# Patient Record
Sex: FEMALE | Race: WHITE | NOT HISPANIC OR LATINO | Employment: UNEMPLOYED | URBAN - METROPOLITAN AREA
[De-identification: names, ages, dates, MRNs, and addresses within clinical notes are randomized per-mention and may not be internally consistent; named-entity substitution may affect disease eponyms.]

---

## 2018-09-05 ENCOUNTER — OFFICE VISIT (OUTPATIENT)
Dept: URGENT CARE | Facility: PHYSICIAN GROUP | Age: 28
End: 2018-09-05
Payer: OTHER GOVERNMENT

## 2018-09-05 VITALS
HEART RATE: 122 BPM | OXYGEN SATURATION: 98 % | DIASTOLIC BLOOD PRESSURE: 64 MMHG | TEMPERATURE: 99.3 F | SYSTOLIC BLOOD PRESSURE: 100 MMHG | RESPIRATION RATE: 16 BRPM

## 2018-09-05 DIAGNOSIS — R00.0 TACHYCARDIA: ICD-10-CM

## 2018-09-05 DIAGNOSIS — J02.9 PHARYNGITIS, UNSPECIFIED ETIOLOGY: ICD-10-CM

## 2018-09-05 DIAGNOSIS — R11.2 NON-INTRACTABLE VOMITING WITH NAUSEA, UNSPECIFIED VOMITING TYPE: ICD-10-CM

## 2018-09-05 LAB
INT CON NEG: NEGATIVE
INT CON POS: POSITIVE
S PYO AG THROAT QL: NORMAL

## 2018-09-05 PROCEDURE — 99204 OFFICE O/P NEW MOD 45 MIN: CPT | Performed by: EMERGENCY MEDICINE

## 2018-09-05 PROCEDURE — 93000 ELECTROCARDIOGRAM COMPLETE: CPT | Performed by: EMERGENCY MEDICINE

## 2018-09-05 PROCEDURE — 87880 STREP A ASSAY W/OPTIC: CPT | Performed by: EMERGENCY MEDICINE

## 2018-09-05 RX ORDER — SULFACETAMIDE SODIUM 100 MG/ML
1 SOLUTION/ DROPS OPHTHALMIC
Qty: 1 BOTTLE | Refills: 0 | Status: SHIPPED | OUTPATIENT
Start: 2018-09-05 | End: 2018-12-06

## 2018-09-05 RX ORDER — ONDANSETRON 4 MG/1
4 TABLET, ORALLY DISINTEGRATING ORAL EVERY 6 HOURS PRN
Qty: 10 TAB | Refills: 0 | Status: SHIPPED | OUTPATIENT
Start: 2018-09-05 | End: 2019-07-09

## 2018-09-05 RX ORDER — ONDANSETRON 4 MG/1
4 TABLET, ORALLY DISINTEGRATING ORAL ONCE
Status: DISCONTINUED | OUTPATIENT
Start: 2018-09-05 | End: 2018-09-05

## 2018-09-05 RX ORDER — ONDANSETRON 4 MG/1
4 TABLET, ORALLY DISINTEGRATING ORAL ONCE
Status: COMPLETED | OUTPATIENT
Start: 2018-09-05 | End: 2018-09-05

## 2018-09-05 RX ADMIN — ONDANSETRON 4 MG: 4 TABLET, ORALLY DISINTEGRATING ORAL at 18:29

## 2018-09-05 ASSESSMENT — ENCOUNTER SYMPTOMS
SENSORY CHANGE: 0
EYE REDNESS: 1
CHILLS: 0
COUGH: 1
SORE THROAT: 1
SPEECH CHANGE: 0
NAUSEA: 0
DIARRHEA: 1
ABDOMINAL PAIN: 0
VOMITING: 1
FEVER: 1
NECK PAIN: 0
NERVOUS/ANXIOUS: 0
MYALGIAS: 0
SINUS PAIN: 1
PALPITATIONS: 1
EYE DISCHARGE: 1
STRIDOR: 0

## 2018-09-05 NOTE — LETTER
September 5, 2018        Mago Beauchamp  [address] [place] [address]        Dear Mago:    Please ask for the next two days off from work for medical reasons     If you have any questions or concerns, please don't hesitate to call.        Sincerely,        Isaias Roque M.D.    Electronically Signed

## 2018-09-06 NOTE — PROGRESS NOTES
Subjective:      Mago Beauchamp is a 28 y.o. female who presents with Emesis (eye redness, vomiting, cough, congestion, diarrhea x1 wk)            HPI    Patient is a 28-year-old female complaining nausea, vomiting, diarrhea . She still nauseated her diarrhea has improved. No foreign travel or risk factors for her diarrhea.    Patient also complains of URI symptoms of conjunctivitis which is her main complaint her eyes were cleaned shot today. Her cough is persistent and dry but she states she does not need any treatment for it. She is also congestion primarily with sinus discomfort.     She states the people she has similar complaints. She denies being pregnant    PMH:  has no past medical history on file.  MEDS: No current outpatient prescriptions on file.    Current Facility-Administered Medications:   •  ondansetron (ZOFRAN ODT) dispertab 4 mg, 4 mg, Oral, Once, Isaias Roque M.D.  ALLERGIES: No Known Allergies  SURGHX: History reviewed. No pertinent surgical history.  SOCHX:  reports that she has never smoked. She has never used smokeless tobacco.  FH: family history is not on file.  Review of Systems   Constitutional: Positive for fever. Negative for chills.   HENT: Positive for sinus pain and sore throat. Negative for congestion.    Eyes: Positive for discharge and redness.   Respiratory: Positive for cough. Negative for stridor.    Cardiovascular: Positive for palpitations. Negative for chest pain.   Gastrointestinal: Positive for diarrhea and vomiting. Negative for abdominal pain and nausea.   Genitourinary: Negative.         Chronic pelvic pain   Musculoskeletal: Negative for myalgias and neck pain.   Skin: Negative for itching and rash.   Neurological: Negative for sensory change and speech change.   Psychiatric/Behavioral: The patient is not nervous/anxious.           Objective:     /64   Pulse (!) 122   Temp 37.4 °C (99.3 °F)   Resp 16   SpO2 98%      Physical Exam   Constitutional: She  appears well-developed and well-nourished. She appears distressed.   HENT:   Head: Normocephalic and atraumatic.   Right Ear: External ear normal.   Left Ear: External ear normal.   Eyes: Pupils are equal, round, and reactive to light. EOM are normal. Right eye exhibits discharge.   Patient has bilateral conjunctivitis right greater than left. Slight discharge on her lipids.   Neck: Normal range of motion.   Cardiovascular: Intact distal pulses.    Heart rate initially 122, EKG was performed with normal findings and the heart rate of 96   Pulmonary/Chest: Effort normal and breath sounds normal. No stridor. No respiratory distress. She has no rales.   Abdominal: She exhibits no distension. There is no tenderness.   Musculoskeletal: Normal range of motion. She exhibits no tenderness.   Lymphadenopathy:     She has no cervical adenopathy.   Neurological: Coordination normal.   Skin: Skin is dry. No rash noted. No erythema.   Psychiatric: She has a normal mood and affect. Her behavior is normal.   Vitals reviewed.            strep negative     EKG sinus rhythm 96 no ST elevations no dysrhythmias no ischemia.  Assessment/Plan:     1. Non-intractable vomiting with nausea, unspecified vomiting type    - ondansetron (ZOFRAN ODT) dispertab 4 mg; Take 1 Tab by mouth Once.    2. Pharyngitis, unspecified etiology      3. Gastroenteritis improving    - POCT Rapid Strep A  I feel the patient's main symptoms are based on a viral illness. Other family members have similar symptoms and sequelae.  I am recommending the patient initiate/ continue hydration efforts including the use of a vaporizer/humidifier/ netti pot. I also recommend the pt, initiate Mucinex, Sudafed or Dayquil if not hypertensive. In addition the patient will initiate the prescribed prescription medication/s:Sulamyd 4 times a day to both eyes.    . If the patient's condition exacerbates with worsening dysphagia, shortness of breath, uncontrolled fever, headache or  chest pressure he/she will return immediately to the urgent care or go to  the emergency department for further evaluation.     Patient works at [employer] and declined a work but I keep her one as I don't think she would be old to work in normal shift based on how she looks to me.    Isaias Roque

## 2018-11-30 ENCOUNTER — TELEPHONE (OUTPATIENT)
Dept: SCHEDULING | Facility: IMAGING CENTER | Age: 28
End: 2018-11-30

## 2018-12-06 ENCOUNTER — APPOINTMENT (OUTPATIENT)
Dept: RADIOLOGY | Facility: MEDICAL CENTER | Age: 28
End: 2018-12-06
Attending: HOSPITALIST
Payer: OTHER GOVERNMENT

## 2018-12-06 ENCOUNTER — HOSPITAL ENCOUNTER (OUTPATIENT)
Facility: MEDICAL CENTER | Age: 28
End: 2018-12-07
Attending: EMERGENCY MEDICINE | Admitting: HOSPITALIST
Payer: OTHER GOVERNMENT

## 2018-12-06 DIAGNOSIS — R51.9 ACUTE INTRACTABLE HEADACHE, UNSPECIFIED HEADACHE TYPE: ICD-10-CM

## 2018-12-06 DIAGNOSIS — G44.201 ACUTE INTRACTABLE TENSION-TYPE HEADACHE: ICD-10-CM

## 2018-12-06 DIAGNOSIS — R42 LIGHTHEADEDNESS: ICD-10-CM

## 2018-12-06 PROBLEM — F17.200 TOBACCO DEPENDENCE: Status: ACTIVE | Noted: 2018-12-06

## 2018-12-06 PROBLEM — Z3A.12 12 WEEKS GESTATION OF PREGNANCY: Status: ACTIVE | Noted: 2018-12-06

## 2018-12-06 PROBLEM — G44.209 TENSION TYPE HEADACHE: Status: ACTIVE | Noted: 2018-12-06

## 2018-12-06 PROBLEM — Z3A.01 LESS THAN 8 WEEKS GESTATION OF PREGNANCY: Status: ACTIVE | Noted: 2018-12-06

## 2018-12-06 LAB
ALBUMIN SERPL BCP-MCNC: 4.4 G/DL (ref 3.2–4.9)
ALBUMIN/GLOB SERPL: 1.6 G/DL
ALP SERPL-CCNC: 47 U/L (ref 30–99)
ALT SERPL-CCNC: 7 U/L (ref 2–50)
ANION GAP SERPL CALC-SCNC: 10 MMOL/L (ref 0–11.9)
APPEARANCE UR: ABNORMAL
AST SERPL-CCNC: 13 U/L (ref 12–45)
BACTERIA #/AREA URNS HPF: ABNORMAL /HPF
BASOPHILS # BLD AUTO: 0.4 % (ref 0–1.8)
BASOPHILS # BLD: 0.04 K/UL (ref 0–0.12)
BILIRUB SERPL-MCNC: 0.4 MG/DL (ref 0.1–1.5)
BILIRUB UR QL STRIP.AUTO: NEGATIVE
BUN SERPL-MCNC: 11 MG/DL (ref 8–22)
CALCIUM SERPL-MCNC: 9.5 MG/DL (ref 8.5–10.5)
CHLORIDE SERPL-SCNC: 104 MMOL/L (ref 96–112)
CO2 SERPL-SCNC: 23 MMOL/L (ref 20–33)
COLOR UR: YELLOW
CREAT SERPL-MCNC: 0.63 MG/DL (ref 0.5–1.4)
D DIMER PPP IA.FEU-MCNC: <0.4 UG/ML (FEU) (ref 0–0.5)
EKG IMPRESSION: NORMAL
EOSINOPHIL # BLD AUTO: 0.03 K/UL (ref 0–0.51)
EOSINOPHIL NFR BLD: 0.3 % (ref 0–6.9)
EPI CELLS #/AREA URNS HPF: ABNORMAL /HPF
ERYTHROCYTE [DISTWIDTH] IN BLOOD BY AUTOMATED COUNT: 42.1 FL (ref 35.9–50)
GLOBULIN SER CALC-MCNC: 2.7 G/DL (ref 1.9–3.5)
GLUCOSE SERPL-MCNC: 108 MG/DL (ref 65–99)
GLUCOSE UR STRIP.AUTO-MCNC: NEGATIVE MG/DL
HCT VFR BLD AUTO: 41.6 % (ref 37–47)
HGB BLD-MCNC: 14.1 G/DL (ref 12–16)
IMM GRANULOCYTES # BLD AUTO: 0.05 K/UL (ref 0–0.11)
IMM GRANULOCYTES NFR BLD AUTO: 0.5 % (ref 0–0.9)
KETONES UR STRIP.AUTO-MCNC: NEGATIVE MG/DL
LEUKOCYTE ESTERASE UR QL STRIP.AUTO: NEGATIVE
LYMPHOCYTES # BLD AUTO: 1.46 K/UL (ref 1–4.8)
LYMPHOCYTES NFR BLD: 13.6 % (ref 22–41)
MAGNESIUM SERPL-MCNC: 1.9 MG/DL (ref 1.5–2.5)
MCH RBC QN AUTO: 31 PG (ref 27–33)
MCHC RBC AUTO-ENTMCNC: 33.9 G/DL (ref 33.6–35)
MCV RBC AUTO: 91.4 FL (ref 81.4–97.8)
MICRO URNS: ABNORMAL
MONOCYTES # BLD AUTO: 0.4 K/UL (ref 0–0.85)
MONOCYTES NFR BLD AUTO: 3.7 % (ref 0–13.4)
NEUTROPHILS # BLD AUTO: 8.76 K/UL (ref 2–7.15)
NEUTROPHILS NFR BLD: 81.5 % (ref 44–72)
NITRITE UR QL STRIP.AUTO: NEGATIVE
NRBC # BLD AUTO: 0 K/UL
NRBC BLD-RTO: 0 /100 WBC
PH UR STRIP.AUTO: 7.5 [PH]
PHOSPHATE SERPL-MCNC: 3.9 MG/DL (ref 2.5–4.5)
PLATELET # BLD AUTO: 237 K/UL (ref 164–446)
PMV BLD AUTO: 10.2 FL (ref 9–12.9)
POTASSIUM SERPL-SCNC: 3.9 MMOL/L (ref 3.6–5.5)
PROT SERPL-MCNC: 7.1 G/DL (ref 6–8.2)
PROT UR QL STRIP: NEGATIVE MG/DL
RBC # BLD AUTO: 4.55 M/UL (ref 4.2–5.4)
RBC UR QL AUTO: NEGATIVE
SODIUM SERPL-SCNC: 137 MMOL/L (ref 135–145)
SP GR UR STRIP.AUTO: 1.02
TSH SERPL DL<=0.005 MIU/L-ACNC: 0.11 UIU/ML (ref 0.38–5.33)
UROBILINOGEN UR STRIP.AUTO-MCNC: 0.2 MG/DL
WBC # BLD AUTO: 10.7 K/UL (ref 4.8–10.8)
WBC #/AREA URNS HPF: ABNORMAL /HPF

## 2018-12-06 PROCEDURE — G0378 HOSPITAL OBSERVATION PER HR: HCPCS

## 2018-12-06 PROCEDURE — 700105 HCHG RX REV CODE 258: Performed by: HOSPITALIST

## 2018-12-06 PROCEDURE — 85025 COMPLETE CBC W/AUTO DIFF WBC: CPT

## 2018-12-06 PROCEDURE — 700102 HCHG RX REV CODE 250 W/ 637 OVERRIDE(OP): Performed by: HOSPITALIST

## 2018-12-06 PROCEDURE — 96374 THER/PROPH/DIAG INJ IV PUSH: CPT

## 2018-12-06 PROCEDURE — 99285 EMERGENCY DEPT VISIT HI MDM: CPT

## 2018-12-06 PROCEDURE — 700105 HCHG RX REV CODE 258: Performed by: EMERGENCY MEDICINE

## 2018-12-06 PROCEDURE — 71045 X-RAY EXAM CHEST 1 VIEW: CPT

## 2018-12-06 PROCEDURE — 99219 PR INITIAL OBSERVATION CARE,LEVL II: CPT | Performed by: HOSPITALIST

## 2018-12-06 PROCEDURE — A9270 NON-COVERED ITEM OR SERVICE: HCPCS | Performed by: HOSPITALIST

## 2018-12-06 PROCEDURE — 84443 ASSAY THYROID STIM HORMONE: CPT

## 2018-12-06 PROCEDURE — 84702 CHORIONIC GONADOTROPIN TEST: CPT

## 2018-12-06 PROCEDURE — 94760 N-INVAS EAR/PLS OXIMETRY 1: CPT

## 2018-12-06 PROCEDURE — 93005 ELECTROCARDIOGRAM TRACING: CPT | Performed by: EMERGENCY MEDICINE

## 2018-12-06 PROCEDURE — 83735 ASSAY OF MAGNESIUM: CPT

## 2018-12-06 PROCEDURE — 700111 HCHG RX REV CODE 636 W/ 250 OVERRIDE (IP): Performed by: EMERGENCY MEDICINE

## 2018-12-06 PROCEDURE — 84100 ASSAY OF PHOSPHORUS: CPT

## 2018-12-06 PROCEDURE — 85379 FIBRIN DEGRADATION QUANT: CPT

## 2018-12-06 PROCEDURE — 80053 COMPREHEN METABOLIC PANEL: CPT

## 2018-12-06 PROCEDURE — 70450 CT HEAD/BRAIN W/O DYE: CPT

## 2018-12-06 PROCEDURE — 81001 URINALYSIS AUTO W/SCOPE: CPT

## 2018-12-06 RX ORDER — POLYETHYLENE GLYCOL 3350 17 G/17G
1 POWDER, FOR SOLUTION ORAL
Status: DISCONTINUED | OUTPATIENT
Start: 2018-12-06 | End: 2018-12-07 | Stop reason: HOSPADM

## 2018-12-06 RX ORDER — ONDANSETRON 2 MG/ML
4 INJECTION INTRAMUSCULAR; INTRAVENOUS ONCE
Status: COMPLETED | OUTPATIENT
Start: 2018-12-06 | End: 2018-12-06

## 2018-12-06 RX ORDER — AMOXICILLIN 250 MG
2 CAPSULE ORAL 2 TIMES DAILY
Status: DISCONTINUED | OUTPATIENT
Start: 2018-12-06 | End: 2018-12-07 | Stop reason: HOSPADM

## 2018-12-06 RX ORDER — PROMETHAZINE HYDROCHLORIDE 12.5 MG/1
12.5-25 SUPPOSITORY RECTAL EVERY 4 HOURS PRN
Status: DISCONTINUED | OUTPATIENT
Start: 2018-12-06 | End: 2018-12-07 | Stop reason: HOSPADM

## 2018-12-06 RX ORDER — POLYETHYLENE GLYCOL 3350 17 G/17G
1 POWDER, FOR SOLUTION ORAL
Status: DISCONTINUED | OUTPATIENT
Start: 2018-12-06 | End: 2018-12-06

## 2018-12-06 RX ORDER — ONDANSETRON 4 MG/1
4 TABLET, ORALLY DISINTEGRATING ORAL EVERY 4 HOURS PRN
Status: DISCONTINUED | OUTPATIENT
Start: 2018-12-06 | End: 2018-12-07 | Stop reason: HOSPADM

## 2018-12-06 RX ORDER — SODIUM CHLORIDE 9 MG/ML
INJECTION, SOLUTION INTRAVENOUS CONTINUOUS
Status: DISCONTINUED | OUTPATIENT
Start: 2018-12-06 | End: 2018-12-07 | Stop reason: HOSPADM

## 2018-12-06 RX ORDER — SODIUM CHLORIDE 9 MG/ML
1000 INJECTION, SOLUTION INTRAVENOUS ONCE
Status: COMPLETED | OUTPATIENT
Start: 2018-12-06 | End: 2018-12-06

## 2018-12-06 RX ORDER — ALBUTEROL SULFATE 90 UG/1
2 AEROSOL, METERED RESPIRATORY (INHALATION) EVERY 4 HOURS PRN
Status: DISCONTINUED | OUTPATIENT
Start: 2018-12-06 | End: 2018-12-07 | Stop reason: HOSPADM

## 2018-12-06 RX ORDER — ACETAMINOPHEN 325 MG/1
650 TABLET ORAL EVERY 6 HOURS PRN
Status: DISCONTINUED | OUTPATIENT
Start: 2018-12-06 | End: 2018-12-07 | Stop reason: HOSPADM

## 2018-12-06 RX ORDER — BUTALBITAL, ACETAMINOPHEN AND CAFFEINE 50; 325; 40 MG/1; MG/1; MG/1
1-2 TABLET ORAL EVERY 6 HOURS PRN
Status: DISCONTINUED | OUTPATIENT
Start: 2018-12-06 | End: 2018-12-07 | Stop reason: HOSPADM

## 2018-12-06 RX ORDER — ONDANSETRON 2 MG/ML
4 INJECTION INTRAMUSCULAR; INTRAVENOUS EVERY 4 HOURS PRN
Status: DISCONTINUED | OUTPATIENT
Start: 2018-12-06 | End: 2018-12-07 | Stop reason: HOSPADM

## 2018-12-06 RX ORDER — BISACODYL 10 MG
10 SUPPOSITORY, RECTAL RECTAL
Status: DISCONTINUED | OUTPATIENT
Start: 2018-12-06 | End: 2018-12-07 | Stop reason: HOSPADM

## 2018-12-06 RX ORDER — BISACODYL 10 MG
10 SUPPOSITORY, RECTAL RECTAL
Status: DISCONTINUED | OUTPATIENT
Start: 2018-12-06 | End: 2018-12-06

## 2018-12-06 RX ORDER — TRAMADOL HYDROCHLORIDE 50 MG/1
50 TABLET ORAL EVERY 6 HOURS PRN
Status: DISCONTINUED | OUTPATIENT
Start: 2018-12-06 | End: 2018-12-07 | Stop reason: HOSPADM

## 2018-12-06 RX ORDER — AMOXICILLIN 250 MG
2 CAPSULE ORAL 2 TIMES DAILY
Status: DISCONTINUED | OUTPATIENT
Start: 2018-12-06 | End: 2018-12-06

## 2018-12-06 RX ORDER — PROMETHAZINE HYDROCHLORIDE 25 MG/1
12.5-25 TABLET ORAL EVERY 4 HOURS PRN
Status: DISCONTINUED | OUTPATIENT
Start: 2018-12-06 | End: 2018-12-07 | Stop reason: HOSPADM

## 2018-12-06 RX ADMIN — STANDARDIZED SENNA CONCENTRATE AND DOCUSATE SODIUM 2 TABLET: 8.6; 5 TABLET, FILM COATED ORAL at 18:39

## 2018-12-06 RX ADMIN — TRAMADOL HYDROCHLORIDE 50 MG: 50 TABLET, FILM COATED ORAL at 20:07

## 2018-12-06 RX ADMIN — SODIUM CHLORIDE: 9 INJECTION, SOLUTION INTRAVENOUS at 18:40

## 2018-12-06 RX ADMIN — ONDANSETRON 4 MG: 2 INJECTION INTRAMUSCULAR; INTRAVENOUS at 16:10

## 2018-12-06 RX ADMIN — SODIUM CHLORIDE 1000 ML: 9 INJECTION, SOLUTION INTRAVENOUS at 15:15

## 2018-12-06 ASSESSMENT — COPD QUESTIONNAIRES
COPD SCREENING SCORE: 4
DURING THE PAST 4 WEEKS HOW MUCH DID YOU FEEL SHORT OF BREATH: SOME OF THE TIME
HAVE YOU SMOKED AT LEAST 100 CIGARETTES IN YOUR ENTIRE LIFE: YES
DO YOU EVER COUGH UP ANY MUCUS OR PHLEGM?: YES, A FEW DAYS A WEEK OR MONTH

## 2018-12-06 ASSESSMENT — LIFESTYLE VARIABLES
EVER_SMOKED: YES
ALCOHOL_USE: NO
EVER_SMOKED: YES

## 2018-12-06 ASSESSMENT — ENCOUNTER SYMPTOMS
RESPIRATORY NEGATIVE: 1
MUSCULOSKELETAL NEGATIVE: 1
CONSTITUTIONAL NEGATIVE: 1
NAUSEA: 1
HEADACHES: 1
DIZZINESS: 1
CARDIOVASCULAR NEGATIVE: 1
VOMITING: 0

## 2018-12-06 ASSESSMENT — PATIENT HEALTH QUESTIONNAIRE - PHQ9
2. FEELING DOWN, DEPRESSED, IRRITABLE, OR HOPELESS: NOT AT ALL
1. LITTLE INTEREST OR PLEASURE IN DOING THINGS: NOT AT ALL
SUM OF ALL RESPONSES TO PHQ9 QUESTIONS 1 AND 2: 0

## 2018-12-06 ASSESSMENT — PAIN SCALES - GENERAL
PAINLEVEL_OUTOF10: 7
PAINLEVEL_OUTOF10: 5

## 2018-12-06 NOTE — ED PROVIDER NOTES
"ED Provider Note    Scribed for Laura Carty M.D. by Young Barreto. 2018, 2:21 PM.    Primary care provider: Pcp Pt States None  Means of arrival: Walk In  History obtained from: Patient  History limited by: None    CHIEF COMPLAINT  Chief Complaint   Patient presents with   • Headache   • Dizziness     HPI  Mago Beauchamp is a 28 y.o. pregnant female who presents to the Emergency Department for a headache.    The patient complains of an onset of headache at 8:45 AM this morning while bending over sitting on the couch. Her headache has been constant, located \"behind my eyes, bilateral temples, and the back of my head\". She rates her current headache as 8/10 in severity, and describes light sensitivity.    The patient complains of lightheadedness, dizziness, and constant \"pounding\" heart palpitations associated with her pregnancy. She states her dizziness is slightly worse with rapid head movements. She denies any vomiting in the last 1 week. She complains of some nausea today. The patient complains of blurred vision secondary to her dizziness, worse in right eye than left eye. The patient reports a history of similar symptoms associated with her previous pregnancies, but states her current headache is worse in severity. She has treated her headache with PO Tylenol without relief.    She complains of generalized muscle soreness, which is greatest over her bilateral shoulders and breasts. The patient describes \"pain in my ovaries\", left greater than right, associated with her current pregnancy, which is new from her previous pregnancies. The patient has an obstetric history of , reporting she is approximately 12 weeks pregnant. The patient's last ultrasound was 5 days ago while at [facility], which patient states was normal. The patient presented to [facility] last week for dizziness, where she was treated for vertigo. The patient is not yet established with OBGYN, stating she has recently " "moved back to [place].     The patient denies any fever, chest pain, shortness of breath, leg swelling, diarrhea.       REVIEW OF SYSTEMS  Pertinent positives include headache, dizziness, lightheadedness, heart palpitations,. Pertinent negatives include no fever, chest pain, shortness of breath, leg swelling, diarrhea. See HPI for further details. All other systems reviewed and negative.     PAST MEDICAL HISTORY   Denies history of DVT.     SURGICAL HISTORY  patient denies any surgical history    SOCIAL HISTORY  Social History   Substance Use Topics   • Smoking status: Current Every Day Smoker   • Smokeless tobacco: Never Used      History   Drug use: Unknown     FAMILY HISTORY  None noted.    CURRENT MEDICATIONS  Home Medications     Reviewed by Margaux Grimaldo R.N. (Registered Nurse) on 12/06/18 at 1203  Med List Status: Complete   Medication Last Dose Status   ondansetron (ZOFRAN ODT) 4 MG TABLET DISPERSIBLE 12/5/2018 Active              ALLERGIES  Allergies   Allergen Reactions   • Reglan [Metoclopramide]    • Zoloft [Sertraline]      PHYSICAL EXAM  VITAL SIGNS: /69   Pulse (!) 53   Temp 36.8 °C (98.2 °F) (Temporal)   Resp 12   Ht 1.676 m (5' 6\")   Wt 52 kg (114 lb 10.2 oz)   LMP 09/19/2018   SpO2 97%   BMI 18.50 kg/m²     General:  Thin, slightly pale appearing. DWN, nontoxic appearing in NAD; A+Ox3; V/S as above. Afebrile.   Skin: warm and dry; good color; no rash.   HEENT: NCAT; EOMs intact; PERRL; no scleral icterus.  No scalp tenderness.  Neck: FROM; no meningismus, no LAD.   Cardiovascular: Bradycardic heart rate. No murmurs, rubs, or gallops; pulses 2+ bilaterally radially and DP areas.   Lungs: Clear to auscultation with good air movement bilaterally. No wheezes, rhonchi, or rales.   Abdomen: BS present; soft; NTND; no rebound, guarding, or rigidity. No organomegaly or pulsatile mass; no CVAT   Extremities: PATEL x 4; no e/o trauma; no pedal edema   Neurologic: CNs III-XII formally intact; " speech clear; distal sensation intact; strength 5/5 UE/LEs.   Psychiatric: Appropriate affect, normal mood                                                        DIAGNOSTIC STUDIES / PROCEDURES    LABS  Results for orders placed or performed during the hospital encounter of 12/06/18   CBC WITH DIFFERENTIAL   Result Value Ref Range    WBC 10.7 4.8 - 10.8 K/uL    RBC 4.55 4.20 - 5.40 M/uL    Hemoglobin 14.1 12.0 - 16.0 g/dL    Hematocrit 41.6 37.0 - 47.0 %    MCV 91.4 81.4 - 97.8 fL    MCH 31.0 27.0 - 33.0 pg    MCHC 33.9 33.6 - 35.0 g/dL    RDW 42.1 35.9 - 50.0 fL    Platelet Count 237 164 - 446 K/uL    MPV 10.2 9.0 - 12.9 fL    Neutrophils-Polys 81.50 (H) 44.00 - 72.00 %    Lymphocytes 13.60 (L) 22.00 - 41.00 %    Monocytes 3.70 0.00 - 13.40 %    Eosinophils 0.30 0.00 - 6.90 %    Basophils 0.40 0.00 - 1.80 %    Immature Granulocytes 0.50 0.00 - 0.90 %    Nucleated RBC 0.00 /100 WBC    Neutrophils (Absolute) 8.76 (H) 2.00 - 7.15 K/uL    Lymphs (Absolute) 1.46 1.00 - 4.80 K/uL    Monos (Absolute) 0.40 0.00 - 0.85 K/uL    Eos (Absolute) 0.03 0.00 - 0.51 K/uL    Baso (Absolute) 0.04 0.00 - 0.12 K/uL    Immature Granulocytes (abs) 0.05 0.00 - 0.11 K/uL    NRBC (Absolute) 0.00 K/uL   CMP   Result Value Ref Range    Sodium 137 135 - 145 mmol/L    Potassium 3.9 3.6 - 5.5 mmol/L    Chloride 104 96 - 112 mmol/L    Co2 23 20 - 33 mmol/L    Anion Gap 10.0 0.0 - 11.9    Glucose 108 (H) 65 - 99 mg/dL    Bun 11 8 - 22 mg/dL    Creatinine 0.63 0.50 - 1.40 mg/dL    Calcium 9.5 8.5 - 10.5 mg/dL    AST(SGOT) 13 12 - 45 U/L    ALT(SGPT) 7 2 - 50 U/L    Alkaline Phosphatase 47 30 - 99 U/L    Total Bilirubin 0.4 0.1 - 1.5 mg/dL    Albumin 4.4 3.2 - 4.9 g/dL    Total Protein 7.1 6.0 - 8.2 g/dL    Globulin 2.7 1.9 - 3.5 g/dL    A-G Ratio 1.6 g/dL   URINALYSIS   Result Value Ref Range    Color Yellow     Character Turbid (A)     Specific Gravity 1.024 <1.035    Ph 7.5 5.0 - 8.0    Glucose Negative Negative mg/dL    Ketones Negative Negative  mg/dL    Protein Negative Negative mg/dL    Bilirubin Negative Negative    Urobilinogen, Urine 0.2 Negative    Nitrite Negative Negative    Leukocyte Esterase Negative Negative    Occult Blood Negative Negative    Micro Urine Req Microscopic    MAGNESIUM   Result Value Ref Range    Magnesium 1.9 1.5 - 2.5 mg/dL   PHOSPHORUS   Result Value Ref Range    Phosphorus 3.9 2.5 - 4.5 mg/dL   TSH   Result Value Ref Range    TSH 0.110 (L) 0.380 - 5.330 uIU/mL   URINE MICROSCOPIC (W/UA)   Result Value Ref Range    WBC 0-2 /hpf    Bacteria Few (A) None /hpf    Epithelial Cells Few /hpf   ESTIMATED GFR   Result Value Ref Range    GFR If African American >60 >60 mL/min/1.73 m 2    GFR If Non African American >60 >60 mL/min/1.73 m 2   D-DIMER   Result Value Ref Range    D-Dimer Screen <0.40 0.00 - 0.50 ug/mL (FEU)   EKG   Result Value Ref Range    Report       [facility] Emergency Dept.    Test Date:  2018  Pt Name:    LAURIE BEAUCHAMP                Department: ER  MRN:        [id number]                      Room:       [facility]  Gender:     Female                       Technician: [id number]  :        1990                   Requested By:KATHLEEN MASCORRO  Order #:    [id number]                    Reading MD: KATHLEEN MASCORRO MD    Measurements  Intervals                                Axis  Rate:       77                           P:          40  NC:         132                          QRS:        74  QRSD:       76                           T:          45  QT:         376  QTc:        426    Interpretive Statements  SINUS RHYTHM  No previous ECG available for comparison    Electronically Signed On 2018 17:05:29 PST by KATHLEEN MASCORRO MD        All labs reviewed by me.      COURSE & MEDICAL DECISION MAKING  Pertinent Labs & Imaging studies reviewed. (See chart for details)    Laurie Beauchamp is a 28 y.o. female who presents complaining of headache, dizziness, pelvic pain.  Patient is ,  approximately 12 weeks pregnant.     Differential Diagnoses include but are not limited to: Space-occupying lesion, venous sinus thrombosis, dehydration, electrolyte abnormality, UTI, orthostatic hypotension.  I do not suspect meningismus or intracranial hemorrhage.      2:21 PM - Patient seen and examined at bedside. Apologized to patient for long wait. Ordered urinalysis, magnesium, phosphorous, TSH, CBC, CMP, EKG to evaluate her symptoms. Patient's blood pressure is initially 118/69.  NS bolus ordered for headache, dizziness and report of decreased intake.    4:05 PM Recheck: Patient re-evaluated at beside. Patient denies any improvement in her headache after resuscitation with IV fluids. She complains she feels hungry, and is now complaining of new right lower extremity pain.     4:23 PM Discussed patient's condition with Dr. Gonzales, Hospitalist, who is agreeable to admit the patient.       Disposition:  Patient will be admitted to the hospital under the care of Dr. Gonzales (Hospitalist) in guarded condition.      FINAL IMPRESSION  1. Acute intractable headache, unspecified headache type    2. Lightheadedness          Young WILLIS (Scribe), am scribing for, and in the presence of, Laura Carty M.D..    Electronically signed by: Young Barreto (Bernabeibe), 12/6/2018    Laura WILLIS M.D. personally performed the services described in this documentation, as scribed by Young Barreto in my presence, and it is both accurate and complete.    The note accurately reflects work and decisions made by me.  Laura Carty  12/6/2018  9:28 PM

## 2018-12-06 NOTE — ED NOTES
Pt comfort check. Pt upset with wait time to see physician. Apologized for wait and comfort measures provided. Gave another warm blanket and reassurance. nad

## 2018-12-06 NOTE — ED TRIAGE NOTES
Pt comes in complaining of HAs and blurred vision. Pt reporting she is approx 12 weeks. Pt complaining of nausea but no vomiting.

## 2018-12-07 ENCOUNTER — PATIENT OUTREACH (OUTPATIENT)
Dept: HEALTH INFORMATION MANAGEMENT | Facility: OTHER | Age: 28
End: 2018-12-07

## 2018-12-07 VITALS
HEART RATE: 69 BPM | RESPIRATION RATE: 20 BRPM | OXYGEN SATURATION: 98 % | SYSTOLIC BLOOD PRESSURE: 110 MMHG | WEIGHT: 113.1 LBS | TEMPERATURE: 98.5 F | BODY MASS INDEX: 18.18 KG/M2 | DIASTOLIC BLOOD PRESSURE: 64 MMHG | HEIGHT: 66 IN

## 2018-12-07 PROBLEM — G44.209 TENSION TYPE HEADACHE: Status: RESOLVED | Noted: 2018-12-06 | Resolved: 2018-12-07

## 2018-12-07 LAB — B-HCG SERPL-ACNC: ABNORMAL MIU/ML (ref 0–5)

## 2018-12-07 PROCEDURE — 99217 PR OBSERVATION CARE DISCHARGE: CPT | Performed by: HOSPITALIST

## 2018-12-07 PROCEDURE — 700111 HCHG RX REV CODE 636 W/ 250 OVERRIDE (IP): Performed by: HOSPITALIST

## 2018-12-07 PROCEDURE — G0378 HOSPITAL OBSERVATION PER HR: HCPCS

## 2018-12-07 PROCEDURE — A9270 NON-COVERED ITEM OR SERVICE: HCPCS | Performed by: HOSPITALIST

## 2018-12-07 PROCEDURE — 96375 TX/PRO/DX INJ NEW DRUG ADDON: CPT

## 2018-12-07 PROCEDURE — 700105 HCHG RX REV CODE 258: Performed by: HOSPITALIST

## 2018-12-07 PROCEDURE — 700102 HCHG RX REV CODE 250 W/ 637 OVERRIDE(OP): Performed by: HOSPITALIST

## 2018-12-07 RX ORDER — TRAMADOL HYDROCHLORIDE 50 MG/1
50 TABLET ORAL EVERY 6 HOURS PRN
Qty: 16 TAB | Refills: 0 | Status: SHIPPED | OUTPATIENT
Start: 2018-12-07 | End: 2018-12-11

## 2018-12-07 RX ADMIN — SODIUM CHLORIDE: 9 INJECTION, SOLUTION INTRAVENOUS at 07:49

## 2018-12-07 RX ADMIN — BUTALBITAL, ACETAMINOPHEN, AND CAFFEINE 2 TABLET: 50; 325; 40 TABLET ORAL at 06:28

## 2018-12-07 RX ADMIN — PROCHLORPERAZINE EDISYLATE 5 MG: 5 INJECTION INTRAMUSCULAR; INTRAVENOUS at 06:26

## 2018-12-07 RX ADMIN — TRAMADOL HYDROCHLORIDE 50 MG: 50 TABLET, FILM COATED ORAL at 02:22

## 2018-12-07 ASSESSMENT — PAIN SCALES - GENERAL
PAINLEVEL_OUTOF10: 7
PAINLEVEL_OUTOF10: 8
PAINLEVEL_OUTOF10: 2

## 2018-12-07 NOTE — PROGRESS NOTES
Discharge instructions, medications and follow-up reviewed with pt, pt verbalized understanding and denies questions. Discharge paperwork and prescription (x1) given to pt. PIV removed, armband removed. Pt walk off floor via wheelchair with hospital escort.

## 2018-12-07 NOTE — PROGRESS NOTES
Assumed care of patient at 1900. Bedside report received from SHAHBAZ Hodge. Initial assessment completed. Patient is A&Ox4 and able to make needs known; neuro intact. C/o HA 7/10. Medicated per MAR. VSS on RA. POC discussed with pt: IVF, pain control. Spouse at bedside. No further questions at this time. Fall precautions in place. Bed locked and in the lowest position, call light instructions provided, call light within reach.

## 2018-12-07 NOTE — H&P
Hospital Medicine History & Physical Note    Date of Service  12/6/2018    Primary Care Physician  Pcp Pt States None    Consultants  None    Code Status  Full code    Chief Complaint  Head    History of Presenting Illness  28 y.o. female who presented 12/6/2018 with results to get past med history other than being currently pregnant.  She is 3 weeks pregnant at this time and was doing fine until today when she bent down.  She had a sudden onset of a headache behind her eyes.  The left eye intensity 7-8 out of 10 sharp pain radiating to the back of her head it cannot.  His pain is lasted for several hours.  There is associated dizziness.  Nausea but no vomiting.  No fever or chills.      She currently does not have a OB/GYN doctor.    Does drink 1 cup of coffee per day but has not any coffee for the last tube    She is a smoker but has not smoked for the last 2-month  The patient denies any fever, chest pain, shortness of breath, leg swelling, diarrhea.     Review of Systems  Review of Systems   Constitutional: Negative.    HENT: Negative.    Respiratory: Negative.    Cardiovascular: Negative.    Gastrointestinal: Positive for nausea. Negative for vomiting.   Genitourinary: Negative.    Musculoskeletal: Negative.    Skin: Negative.    Neurological: Positive for dizziness and headaches.       Past Medical History  Patient denies    Tobacco dependent    Surgical History  Patient denies    Family History  Family history of stroke    Social History   reports that she has been smoking.  She has never used smokeless tobacco.     She smoked 1 pack every other day she started 817.  No smoking for last 2-month    No illicit drug    Allergies  Allergies   Allergen Reactions   • Reglan [Metoclopramide]    • Zoloft [Sertraline]        Medications  Prior to Admission Medications   Prescriptions Last Dose Informant Patient Reported? Taking?   ondansetron (ZOFRAN ODT) 4 MG TABLET DISPERSIBLE 12/5/2018 at Unknown time  No No   Sig:  Take 1 Tab by mouth every 6 hours as needed for Nausea.      Facility-Administered Medications: None       Physical Exam  Temp:  [36.8 °C (98.2 °F)] 36.8 °C (98.2 °F)  Pulse:  [53-88] 77  Resp:  [12-19] 19  BP: (106-123)/(60-71) 123/63    Physical Exam   Constitutional: She is oriented to person, place, and time. She appears well-developed and well-nourished. No distress.   HENT:   Head: Normocephalic and atraumatic.   Mouth/Throat: Oropharynx is clear and moist. No oropharyngeal exudate.   Eyes: Pupils are equal, round, and reactive to light. EOM are normal. Left eye exhibits no discharge. No scleral icterus.   Neck: Normal range of motion. Neck supple. No JVD present. No tracheal deviation present. No thyromegaly present.   Cardiovascular: Normal rate and intact distal pulses.  Exam reveals no gallop and no friction rub.    No murmur heard.  Pulmonary/Chest: Effort normal and breath sounds normal. No respiratory distress. She has no wheezes. She has no rales. She exhibits no tenderness.   Abdominal: Soft. Bowel sounds are normal. She exhibits no distension. There is no tenderness. There is no rebound and no guarding.   Musculoskeletal: Normal range of motion. She exhibits no edema or deformity.   Neurological: She is alert and oriented to person, place, and time. No cranial nerve deficit.   Skin: Skin is dry. No rash noted. She is not diaphoretic. No erythema. No pallor.   Psychiatric: She has a normal mood and affect.       Laboratory:  Recent Labs      12/06/18   1455   WBC  10.7   RBC  4.55   HEMOGLOBIN  14.1   HEMATOCRIT  41.6   MCV  91.4   MCH  31.0   MCHC  33.9   RDW  42.1   PLATELETCT  237   MPV  10.2     Recent Labs      12/06/18   1455   SODIUM  137   POTASSIUM  3.9   CHLORIDE  104   CO2  23   GLUCOSE  108*   BUN  11   CREATININE  0.63   CALCIUM  9.5     Recent Labs      12/06/18   1455   ALTSGPT  7   ASTSGOT  13   ALKPHOSPHAT  47   TBILIRUBIN  0.4   GLUCOSE  108*                 No results for input(s):  TROPONINI in the last 72 hours.    Urinalysis:    Recent Labs      12/06/18   1455   SPECGRAVITY  1.024   GLUCOSEUR  Negative   KETONES  Negative   NITRITE  Negative   LEUKESTERAS  Negative   WBCURINE  0-2   BACTERIA  Few*   EPITHELCELL  Few        Imaging:  CT-HEAD W/O    (Results Pending)         Assessment/Plan:  I anticipate this patient is appropriate for observation status at this time.    * Tension type headache- (present on admission)   Assessment & Plan    CT of the head     IV fluid hydration    Fioricet for headache    Ultram for headache    Check d-dimer    D-dimer is elevated will get an MRV of the head to look for venous thrombosis     Tobacco dependence- (present on admission)   Assessment & Plan    Smoking cessation advised     12 weeks gestation of pregnancy- (present on admission)   Assessment & Plan    She already had a recent ultrasound showing within normal limit         VTE prophylaxis: scd

## 2018-12-07 NOTE — DISCHARGE INSTRUCTIONS
Discharge Instructions    Discharged to home by car with relative. Discharged via wheelchair, hospital escort: Yes.  Special equipment needed: Not Applicable    Be sure to schedule a follow-up appointment with your primary care doctor or any specialists as instructed.     Discharge Plan:   Diet Plan: Discussed  Activity Level: Discussed  Smoking Cessation Offered: Patient Counseled  Confirmed Follow up Appointment: Patient to Call and Schedule Appointment  Confirmed Symptoms Management: Discussed  Medication Reconciliation Updated: Yes  Influenza Vaccine Indication: Not indicated: Previously immunized this influenza season and > 8 years of age    I understand that a diet low in cholesterol, fat, and sodium is recommended for good health. Unless I have been given specific instructions below for another diet, I accept this instruction as my diet prescription.   Other diet: Regular    Special Instructions: None    · Is patient discharged on Warfarin / Coumadin?   No     Depression / Suicide Risk    As you are discharged from this [facility] facility, it is important to learn how to keep safe from harming yourself.    Recognize the warning signs:  · Abrupt changes in personality, positive or negative- including increase in energy   · Giving away possessions  · Change in eating patterns- significant weight changes-  positive or negative  · Change in sleeping patterns- unable to sleep or sleeping all the time   · Unwillingness or inability to communicate  · Depression  · Unusual sadness, discouragement and loneliness  · Talk of wanting to die  · Neglect of personal appearance   · Rebelliousness- reckless behavior  · Withdrawal from people/activities they love  · Confusion- inability to concentrate     If you or a loved one observes any of these behaviors or has concerns about self-harm, here's what you can do:  · Talk about it- your feelings and reasons for harming yourself  · Remove any means that you might use to  hurt yourself (examples: pills, rope, extension cords, firearm)  · Get professional help from the community (Mental Health, Substance Abuse, psychological counseling)  · Do not be alone:Call your Safe Contact- someone whom you trust who will be there for you.  · Call your local CRISIS HOTLINE 688-6257 or 859-928-2695  · Call your local Children's Mobile Crisis Response Team [place] (305) 678-9738 or www.Mingly  · Call the toll free National Suicide Prevention Hotlines   · National Suicide Prevention Lifeline 745-431-KNCW (0488)  · National Hope Line Network 800-SUICIDE (873-4201)

## 2018-12-07 NOTE — PROGRESS NOTES
Pt c/o severe HA 7-8/10, L sided only at this time, behind the eye, L temple, radiating to back of head.

## 2018-12-07 NOTE — PROGRESS NOTES
"Pt c/o nausea and states, \"I don't feel good.\"  Pt vomited 50mL clear bile and water. C/o HA on both R and L side 7-8/10. Called pharmacy for recommendation re: safe antiemetic for pregnancy.  "

## 2018-12-07 NOTE — PROGRESS NOTES
Assessment completed. Pt A&Ox4. Respirations are even and unlabored on RA. Pt denies pain at this time. Reports nausea. Dizziness at this time, worse with movement. Monitors applied, VS stable, call light and belongings within reach. POC updated. Pt educated on room and call light, pt verbalized understanding. Communication board updated. Needs met. Friend/family at bedside.

## 2018-12-07 NOTE — ASSESSMENT & PLAN NOTE
CT of the head     IV fluid hydration    Fioricet for headache    Ultram for headache    Check d-dimer    D-dimer is elevated will get an MRV of the head to look for venous thrombosis

## 2018-12-07 NOTE — DISCHARGE SUMMARY
Discharge Summary    CHIEF COMPLAINT ON ADMISSION  Chief Complaint   Patient presents with   • Headache   • Dizziness       Reason for Admission  Headache; Blurred Vision      Admission Date  12/6/2018    CODE STATUS  Full Code    HPI & HOSPITAL COURSE  28 y.o. female who presented 12/6/2018 with results to get past med history other than being currently pregnant.  She is 3 weeks pregnant at this time and was doing fine until today when she bent down.  She had a sudden onset of a headache behind her eyes.  The left eye intensity 7-8 out of 10 sharp pain radiating to the back of her head it cannot.  His pain is lasted for several hours.  There is associated dizziness.  Nausea but no vomiting.  No fever or chills.        She currently does not have a OB/GYN doctor.     Does drink 1 cup of coffee per day but has not any coffee for the last tube     She is a smoker but has not smoked for the last 2-month  The patient denies any fever, chest pain, shortness of breath, leg swelling, diarrhea.      This patient complained of shortness of breath.  X-ray of the chest was normal.  Oxygen saturations were normal.  CT head was done was negative.  Her pain in the head improved with p.o. Ultram.  She was stable for discharge on 12/7/2018       Therefore, she is discharged in good and stable condition to home with close outpatient follow-up.    The patient recovered much more quickly than anticipated on admission.    Discharge Date  12/7/2018    FOLLOW UP ITEMS POST DISCHARGE      DISCHARGE DIAGNOSES  Principal Problem (Resolved):    Tension type headache POA: Yes  Active Problems:    12 weeks gestation of pregnancy POA: Yes    Tobacco dependence POA: Yes      FOLLOW UP  Future Appointments  Date Time Provider Department Center   12/21/2018 11:00 AM [facility] [address]     No follow-up provider specified.    MEDICATIONS ON DISCHARGE     Medication List      START taking these medications      Instructions   tramadol  50 MG Tabs  Commonly known as:  ULTRAM   Take 1 Tab by mouth every 6 hours as needed (headache) for up to 4 days.  Dose:  50 mg        CONTINUE taking these medications      Instructions   ondansetron 4 MG Tbdp  Commonly known as:  ZOFRAN ODT   Take 1 Tab by mouth every 6 hours as needed for Nausea.  Dose:  4 mg            Allergies  Allergies   Allergen Reactions   • Reglan [Metoclopramide]    • Zoloft [Sertraline]        DIET  Orders Placed This Encounter   Procedures   • Diet Order Cardiac     Standing Status:   Standing     Number of Occurrences:   1     Order Specific Question:   Diet:     Answer:   Cardiac [6]       ACTIVITY  As tolerated.  Weight bearing as tolerated    CONSULTATIONS  none    PROCEDURES    CLINICAL DECISION UNIT Mago Beauchamp  MRN: [id number], : 1990, Sex: F  Adm: 2018, D/C: 2018   Order   DX-CHEST-PORTABLE (1 VIEW) [[id number]] (Order [id number])   Patient Information     Patient Name  Mago Beauchamp ([id number]) Sex  Female   1990   Room Bed Code Status   T216 00 FULL   Reprint Order Requisition     DX-CHEST-PORTABLE (1 VIEW) (Order #[id number]) on 18   Last Resulted Time   Thu Dec 6, 2018  7:43 PM   Images     Show images for DX-CHEST-PORTABLE (1 VIEW)   Imaging Result Status     Status: Final result (Exam End: 2018  7:37 PM)   Imaging Previous Results     Open Hard Copy Result Report (Order #[id number] - DX-CHEST-PORTABLE (1 VIEW))   Narrative       2018 7:00 PM    HISTORY/REASON FOR EXAM:  Shortness of Breath.      TECHNIQUE/EXAM DESCRIPTION AND NUMBER OF VIEWS:  Single portable view of the chest.    COMPARISON: None    FINDINGS:  Single portable view of the chest demonstrates a normal cardiac silhouette and mediastinal contours.    No discrete opacity, pleural fluid, or pneumothorax.    No suspicious bony lesions.   Impression       No acute cardiopulmonary findings.      -----------------------------------------------------------------------------------------    CLINICAL DECISION UNIT Mago Beauchamp  MRN: [id number], : 1990, Sex: F  Adm: 2018, D/C: 2018   Order   CT-HEAD W/O [[id number]] (Order [id number])   Patient Information     Patient Name  Mago Beauchamp ([id number]) Sex  Female   1990   Room Bed Code Status   T216 00 FULL   Reprint Order Requisition     CT-HEAD W/O (Order #[id number]) on 18   Last Resulted Time   u Dec 6, 2018  7:22 PM   Images     Show images for CT-HEAD W/O   Imaging Result Status     Status: Final result (Exam End: 2018  7:16 PM)   Imaging Previous Results     Open Hard Copy Result Report (Order #[id number] - CT-HEAD W/O)   Narrative       2018 7:01 PM    HISTORY/REASON FOR EXAM:  Headache.      TECHNIQUE/EXAM DESCRIPTION AND NUMBER OF VIEWS:  CT of the head without contrast.    Up to date radiation dose reduction adjustments have been utilized to meet ALARA standards for radiation dose reduction.    COMPARISON:  None available    FINDINGS:  No acute hemorrhage, mass-effect, or midline shift.   No intracranial mass or acute ischemia identified.   Ventricles and cisterns are normal.   Gray/white matter differentiation is maintained.    The paranasal sinuses and mastoid air cells are clear.     Impression       No acute intracranial findings.         LABORATORY  Lab Results   Component Value Date    SODIUM 137 2018    POTASSIUM 3.9 2018    CHLORIDE 104 2018    CO2 23 2018    GLUCOSE 108 (H) 2018    BUN 11 2018    CREATININE 0.63 2018        Lab Results   Component Value Date    WBC 10.7 2018    HEMOGLOBIN 14.1 2018    HEMATOCRIT 41.6 2018    PLATELETCT 237 2018        Total time of the discharge process exceeds 38 minutes.

## 2018-12-07 NOTE — CARE PLAN
Problem: Pain Management  Goal: Pain level will decrease to patient's comfort goal  Outcome: PROGRESSING SLOWER THAN EXPECTED      Problem: Psychosocial Needs:  Goal: Level of anxiety will decrease  Outcome: PROGRESSING AS EXPECTED

## 2018-12-12 ENCOUNTER — HOSPITAL ENCOUNTER (EMERGENCY)
Facility: MEDICAL CENTER | Age: 28
End: 2018-12-12
Attending: EMERGENCY MEDICINE
Payer: OTHER GOVERNMENT

## 2018-12-12 ENCOUNTER — APPOINTMENT (OUTPATIENT)
Dept: RADIOLOGY | Facility: MEDICAL CENTER | Age: 28
End: 2018-12-12
Attending: EMERGENCY MEDICINE
Payer: OTHER GOVERNMENT

## 2018-12-12 VITALS
WEIGHT: 112.43 LBS | OXYGEN SATURATION: 97 % | RESPIRATION RATE: 16 BRPM | TEMPERATURE: 98 F | HEART RATE: 85 BPM | SYSTOLIC BLOOD PRESSURE: 152 MMHG | HEIGHT: 66 IN | DIASTOLIC BLOOD PRESSURE: 79 MMHG | BODY MASS INDEX: 18.07 KG/M2

## 2018-12-12 DIAGNOSIS — O26.899 ABDOMINAL PAIN AFFECTING PREGNANCY: ICD-10-CM

## 2018-12-12 DIAGNOSIS — R10.9 ABDOMINAL PAIN AFFECTING PREGNANCY: ICD-10-CM

## 2018-12-12 DIAGNOSIS — M54.50 ACUTE MIDLINE LOW BACK PAIN WITHOUT SCIATICA: ICD-10-CM

## 2018-12-12 LAB
ALBUMIN SERPL BCP-MCNC: 4.3 G/DL (ref 3.2–4.9)
ALBUMIN/GLOB SERPL: 1.6 G/DL
ALP SERPL-CCNC: 52 U/L (ref 30–99)
ALT SERPL-CCNC: 6 U/L (ref 2–50)
ANION GAP SERPL CALC-SCNC: 9 MMOL/L (ref 0–11.9)
AST SERPL-CCNC: 12 U/L (ref 12–45)
B-HCG SERPL-ACNC: ABNORMAL MIU/ML (ref 0–5)
BASOPHILS # BLD AUTO: 0.5 % (ref 0–1.8)
BASOPHILS # BLD: 0.05 K/UL (ref 0–0.12)
BILIRUB SERPL-MCNC: 0.3 MG/DL (ref 0.1–1.5)
BUN SERPL-MCNC: 10 MG/DL (ref 8–22)
CALCIUM SERPL-MCNC: 10.1 MG/DL (ref 8.5–10.5)
CHLORIDE SERPL-SCNC: 108 MMOL/L (ref 96–112)
CO2 SERPL-SCNC: 18 MMOL/L (ref 20–33)
CREAT SERPL-MCNC: 0.63 MG/DL (ref 0.5–1.4)
EOSINOPHIL # BLD AUTO: 0.02 K/UL (ref 0–0.51)
EOSINOPHIL NFR BLD: 0.2 % (ref 0–6.9)
ERYTHROCYTE [DISTWIDTH] IN BLOOD BY AUTOMATED COUNT: 40.3 FL (ref 35.9–50)
GLOBULIN SER CALC-MCNC: 2.7 G/DL (ref 1.9–3.5)
GLUCOSE SERPL-MCNC: 98 MG/DL (ref 65–99)
HCT VFR BLD AUTO: 36.1 % (ref 37–47)
HGB BLD-MCNC: 13 G/DL (ref 12–16)
IMM GRANULOCYTES # BLD AUTO: 0.2 K/UL (ref 0–0.11)
IMM GRANULOCYTES NFR BLD AUTO: 1.9 % (ref 0–0.9)
LIPASE SERPL-CCNC: 15 U/L (ref 11–82)
LYMPHOCYTES # BLD AUTO: 1.87 K/UL (ref 1–4.8)
LYMPHOCYTES NFR BLD: 18.2 % (ref 22–41)
MCH RBC QN AUTO: 31.5 PG (ref 27–33)
MCHC RBC AUTO-ENTMCNC: 36 G/DL (ref 33.6–35)
MCV RBC AUTO: 87.4 FL (ref 81.4–97.8)
MONOCYTES # BLD AUTO: 0.54 K/UL (ref 0–0.85)
MONOCYTES NFR BLD AUTO: 5.3 % (ref 0–13.4)
NEUTROPHILS # BLD AUTO: 7.6 K/UL (ref 2–7.15)
NEUTROPHILS NFR BLD: 73.9 % (ref 44–72)
NRBC # BLD AUTO: 0 K/UL
NRBC BLD-RTO: 0 /100 WBC
NUMBER OF RH DOSES IND 8505RD: NORMAL
PLATELET # BLD AUTO: 264 K/UL (ref 164–446)
PMV BLD AUTO: 10.2 FL (ref 9–12.9)
POTASSIUM SERPL-SCNC: 3.5 MMOL/L (ref 3.6–5.5)
PROT SERPL-MCNC: 7 G/DL (ref 6–8.2)
RBC # BLD AUTO: 4.13 M/UL (ref 4.2–5.4)
RH BLD: NORMAL
SODIUM SERPL-SCNC: 135 MMOL/L (ref 135–145)
WBC # BLD AUTO: 10.3 K/UL (ref 4.8–10.8)

## 2018-12-12 PROCEDURE — 85025 COMPLETE CBC W/AUTO DIFF WBC: CPT

## 2018-12-12 PROCEDURE — 99285 EMERGENCY DEPT VISIT HI MDM: CPT

## 2018-12-12 PROCEDURE — 96375 TX/PRO/DX INJ NEW DRUG ADDON: CPT

## 2018-12-12 PROCEDURE — 76801 OB US < 14 WKS SINGLE FETUS: CPT

## 2018-12-12 PROCEDURE — 84702 CHORIONIC GONADOTROPIN TEST: CPT

## 2018-12-12 PROCEDURE — 86901 BLOOD TYPING SEROLOGIC RH(D): CPT

## 2018-12-12 PROCEDURE — 96374 THER/PROPH/DIAG INJ IV PUSH: CPT

## 2018-12-12 PROCEDURE — 700105 HCHG RX REV CODE 258: Performed by: EMERGENCY MEDICINE

## 2018-12-12 PROCEDURE — A9270 NON-COVERED ITEM OR SERVICE: HCPCS | Performed by: EMERGENCY MEDICINE

## 2018-12-12 PROCEDURE — 83690 ASSAY OF LIPASE: CPT

## 2018-12-12 PROCEDURE — 80053 COMPREHEN METABOLIC PANEL: CPT

## 2018-12-12 PROCEDURE — 700111 HCHG RX REV CODE 636 W/ 250 OVERRIDE (IP): Performed by: EMERGENCY MEDICINE

## 2018-12-12 PROCEDURE — 700111 HCHG RX REV CODE 636 W/ 250 OVERRIDE (IP)

## 2018-12-12 PROCEDURE — 700102 HCHG RX REV CODE 250 W/ 637 OVERRIDE(OP): Performed by: EMERGENCY MEDICINE

## 2018-12-12 RX ORDER — SODIUM CHLORIDE 9 MG/ML
1000 INJECTION, SOLUTION INTRAVENOUS ONCE
Status: COMPLETED | OUTPATIENT
Start: 2018-12-12 | End: 2018-12-12

## 2018-12-12 RX ORDER — ONDANSETRON 2 MG/ML
4 INJECTION INTRAMUSCULAR; INTRAVENOUS ONCE
Status: COMPLETED | OUTPATIENT
Start: 2018-12-12 | End: 2018-12-12

## 2018-12-12 RX ORDER — HYDROCODONE BITARTRATE AND ACETAMINOPHEN 5; 325 MG/1; MG/1
1-2 TABLET ORAL EVERY 4 HOURS PRN
Qty: 8 TAB | Refills: 0 | Status: SHIPPED | OUTPATIENT
Start: 2018-12-12 | End: 2018-12-15

## 2018-12-12 RX ORDER — ACETAMINOPHEN 325 MG/1
650 TABLET ORAL ONCE
Status: COMPLETED | OUTPATIENT
Start: 2018-12-12 | End: 2018-12-12

## 2018-12-12 RX ADMIN — SODIUM CHLORIDE 1000 ML: 9 INJECTION, SOLUTION INTRAVENOUS at 18:29

## 2018-12-12 RX ADMIN — ACETAMINOPHEN 650 MG: 325 TABLET, FILM COATED ORAL at 19:22

## 2018-12-12 RX ADMIN — FENTANYL CITRATE 100 MCG: 50 INJECTION, SOLUTION INTRAMUSCULAR; INTRAVENOUS at 17:29

## 2018-12-12 RX ADMIN — ONDANSETRON 4 MG: 2 INJECTION INTRAMUSCULAR; INTRAVENOUS at 17:29

## 2018-12-13 NOTE — ED TRIAGE NOTES
"Patient presents to ED via EMS from her home with c/o intermittent cramping which she states are \"contractions\". States they started spontaneously at 1530. Patient denies vaginal bleeding. States she is approx 12 weeks pregnant and has appointment to see OB this week.   "

## 2018-12-13 NOTE — ED PROVIDER NOTES
"ED Provider Note    CHIEF COMPLAINT  Chief Complaint   Patient presents with   • Abdominal Pain   • Pregnancy     12 weeks       HPI  Mago Beauchamp is a 28 y.o. female who presents for evaluation of acute onset crampy lower abdominal pain.  Patient reports that she is approximately 12 weeks pregnant.  She has not had any prenatal care.  This is her sixth pregnancy she has had 2 vaginal deliveries 2 miscarriages and an unknown result in another pregnancy.  She recently relocated from [place].  She reports her last menstrual cycle was around 3 months ago.  She currently denies vaginal bleeding she reports suprapubic pain no flank pain or dysuria.  She has not had any vaginal bleeding passage of large clots or tissue    REVIEW OF SYSTEMS  See HPI for further details.  No high fevers chills night sweats weight loss all other systems are negative.     PAST MEDICAL HISTORY  No past medical history on file.  None reported  FAMILY HISTORY  Noncontributory    SOCIAL HISTORY  Social History     Social History   • Marital status:      Spouse name: N/A   • Number of children: N/A   • Years of education: N/A     Social History Main Topics   • Smoking status: Current Every Day Smoker   • Smokeless tobacco: Never Used   • Alcohol use Not on file   • Drug use: Unknown   • Sexual activity: Not on file     Other Topics Concern   • Not on file     Social History Narrative   • No narrative on file       SURGICAL HISTORY  No past surgical history on file.    CURRENT MEDICATIONS  Home Medications     Reviewed by Chiquita Magana RTinoN. (Registered Nurse) on 12/12/18 at 1711  Med List Status: Partial   Medication Last Dose Status   ondansetron (ZOFRAN ODT) 4 MG TABLET DISPERSIBLE  Active                ALLERGIES  Allergies   Allergen Reactions   • Reglan [Metoclopramide]    • Zoloft [Sertraline]        PHYSICAL EXAM  VITAL SIGNS: /79   Pulse 93   Temp 36.7 °C (98 °F) (Tympanic)   Resp 16   Ht 1.676 m (5' 6\")   Wt 51 " kg (112 lb 7 oz)   LMP 09/19/2018   SpO2 93%   BMI 18.15 kg/m²  Room air O2: 97    Constitutional: Patient appears to be uncomfortable  HENT: Normocephalic, Atraumatic, Bilateral external ears normal, Oropharynx moist, No oral exudates, Nose normal.   Eyes: PERRLA, EOMI, Conjunctiva normal, No discharge.   Neck: Normal range of motion, No tenderness, Supple, No stridor.   Lymphatic: No lymphadenopathy noted.   Cardiovascular: N moderately tachycardic normal rhythm, No murmurs, No rubs, No gallops.   Thorax & Lungs: Normal breath sounds, No respiratory distress, No wheezing, No chest tenderness.   Abdomen: Bowel sounds normal, Soft, No tenderness, No masses, No pulsatile masses.   Skin: Warm, Dry, No erythema, No rash.   Back: Patient has some tenderness over her sacrum  Genitalia: Declined pelvic exam declined pelvic exam  Extremities: Intact distal pulses, No edema, No tenderness, No cyanosis, No clubbing.   Musculoskeletal: Good range of motion in all major joints. No tenderness to palpation or major deformities noted.   Neurologic: Alert & oriented x 3, Normal motor function, Normal sensory function, No focal deficits noted.   Psychiatric: Extremely anxious    Results for orders placed or performed during the hospital encounter of 12/12/18   CBC WITH DIFFERENTIAL   Result Value Ref Range    WBC 10.3 4.8 - 10.8 K/uL    RBC 4.13 (L) 4.20 - 5.40 M/uL    Hemoglobin 13.0 12.0 - 16.0 g/dL    Hematocrit 36.1 (L) 37.0 - 47.0 %    MCV 87.4 81.4 - 97.8 fL    MCH 31.5 27.0 - 33.0 pg    MCHC 36.0 (H) 33.6 - 35.0 g/dL    RDW 40.3 35.9 - 50.0 fL    Platelet Count 264 164 - 446 K/uL    MPV 10.2 9.0 - 12.9 fL    Neutrophils-Polys 73.90 (H) 44.00 - 72.00 %    Lymphocytes 18.20 (L) 22.00 - 41.00 %    Monocytes 5.30 0.00 - 13.40 %    Eosinophils 0.20 0.00 - 6.90 %    Basophils 0.50 0.00 - 1.80 %    Immature Granulocytes 1.90 (H) 0.00 - 0.90 %    Nucleated RBC 0.00 /100 WBC    Neutrophils (Absolute) 7.60 (H) 2.00 - 7.15 K/uL     Lymphs (Absolute) 1.87 1.00 - 4.80 K/uL    Monos (Absolute) 0.54 0.00 - 0.85 K/uL    Eos (Absolute) 0.02 0.00 - 0.51 K/uL    Baso (Absolute) 0.05 0.00 - 0.12 K/uL    Immature Granulocytes (abs) 0.20 (H) 0.00 - 0.11 K/uL    NRBC (Absolute) 0.00 K/uL   COMP METABOLIC PANEL   Result Value Ref Range    Sodium 135 135 - 145 mmol/L    Potassium 3.5 (L) 3.6 - 5.5 mmol/L    Chloride 108 96 - 112 mmol/L    Co2 18 (L) 20 - 33 mmol/L    Anion Gap 9.0 0.0 - 11.9    Glucose 98 65 - 99 mg/dL    Bun 10 8 - 22 mg/dL    Creatinine 0.63 0.50 - 1.40 mg/dL    Calcium 10.1 8.5 - 10.5 mg/dL    AST(SGOT) 12 12 - 45 U/L    ALT(SGPT) 6 2 - 50 U/L    Alkaline Phosphatase 52 30 - 99 U/L    Total Bilirubin 0.3 0.1 - 1.5 mg/dL    Albumin 4.3 3.2 - 4.9 g/dL    Total Protein 7.0 6.0 - 8.2 g/dL    Globulin 2.7 1.9 - 3.5 g/dL    A-G Ratio 1.6 g/dL   LIPASE   Result Value Ref Range    Lipase 15 11 - 82 U/L   ESTIMATED GFR   Result Value Ref Range    GFR If African American >60 >60 mL/min/1.73 m 2    GFR If Non African American >60 >60 mL/min/1.73 m 2      RADIOLOGY/PROCEDURES  US-OB 1ST TRIMESTER SINGLE GEST Is the patient pregnant? Yes   Final Result      Viable single intrauterine gestation of an estimated 12 weeks 1 day sonographic age.            COURSE & MEDICAL DECISION MAKING  Pertinent Labs & Imaging studies reviewed. (See chart for details)  An IV was established.  The patient was in extreme pain and nauseous and could not tolerate oral liquids and she was tachycardic.  Therefore she was given IV fluids.  After IV fluid administration she felt better and her heart rate came down.  The patient was given a small amount of pain medicine as well.  Immediately came to the bedside as I was concerned about possible ruptured ectopic pregnancy.  I performed a stat transabdominal ultrasound and intrauterine pregnancy was confirmed and there is no free fluid.  Subsequent formal and specific ultrasound was performed confirming intrauterine pregnancy.   Patient likely has some pelvic and musculoskeletal pain related to multiple pregnancies, circulating relaxin hormone and some mild joint instability.  She has no evidence of leukocytosis fever or active labor.  I will give her a small prescription of Norco for 2 days and recommend that she needs to establish care with a PCP  FINAL IMPRESSION  1.  Abdominal pain in pregnancy         Electronically signed by: Ed Talley, 12/12/2018 5:22 PM

## 2019-02-02 ENCOUNTER — APPOINTMENT (OUTPATIENT)
Dept: RADIOLOGY | Facility: MEDICAL CENTER | Age: 29
End: 2019-02-02
Attending: SPECIALIST
Payer: OTHER GOVERNMENT

## 2019-02-02 ENCOUNTER — HOSPITAL ENCOUNTER (OUTPATIENT)
Facility: MEDICAL CENTER | Age: 29
End: 2019-02-03
Attending: SPECIALIST | Admitting: SPECIALIST
Payer: OTHER GOVERNMENT

## 2019-02-02 LAB
ALBUMIN SERPL BCP-MCNC: 3.4 G/DL (ref 3.2–4.9)
ALBUMIN/GLOB SERPL: 1.5 G/DL
ALP SERPL-CCNC: 47 U/L (ref 30–99)
ALT SERPL-CCNC: <5 U/L (ref 2–50)
ANION GAP SERPL CALC-SCNC: 7 MMOL/L (ref 0–11.9)
APPEARANCE UR: ABNORMAL
AST SERPL-CCNC: 11 U/L (ref 12–45)
BASOPHILS # BLD AUTO: 0.5 % (ref 0–1.8)
BASOPHILS # BLD: 0.05 K/UL (ref 0–0.12)
BILIRUB SERPL-MCNC: 0.3 MG/DL (ref 0.1–1.5)
BUN SERPL-MCNC: 6 MG/DL (ref 8–22)
CALCIUM SERPL-MCNC: 8.5 MG/DL (ref 8.5–10.5)
CHLORIDE SERPL-SCNC: 110 MMOL/L (ref 96–112)
CO2 SERPL-SCNC: 21 MMOL/L (ref 20–33)
COLOR UR AUTO: YELLOW
CREAT SERPL-MCNC: 0.56 MG/DL (ref 0.5–1.4)
EOSINOPHIL # BLD AUTO: 0.11 K/UL (ref 0–0.51)
EOSINOPHIL NFR BLD: 1 % (ref 0–6.9)
ERYTHROCYTE [DISTWIDTH] IN BLOOD BY AUTOMATED COUNT: 48.1 FL (ref 35.9–50)
GLOBULIN SER CALC-MCNC: 2.2 G/DL (ref 1.9–3.5)
GLUCOSE SERPL-MCNC: 93 MG/DL (ref 65–99)
GLUCOSE UR QL STRIP.AUTO: NEGATIVE MG/DL
HCT VFR BLD AUTO: 40.4 % (ref 37–47)
HGB BLD-MCNC: 13.3 G/DL (ref 12–16)
HOLDING TUBE BB 8507: NORMAL
IMM GRANULOCYTES # BLD AUTO: 0.09 K/UL (ref 0–0.11)
IMM GRANULOCYTES NFR BLD AUTO: 0.8 % (ref 0–0.9)
KETONES UR QL STRIP.AUTO: NEGATIVE MG/DL
LEUKOCYTE ESTERASE UR QL STRIP.AUTO: NEGATIVE
LYMPHOCYTES # BLD AUTO: 1.95 K/UL (ref 1–4.8)
LYMPHOCYTES NFR BLD: 18.2 % (ref 22–41)
MCH RBC QN AUTO: 31.6 PG (ref 27–33)
MCHC RBC AUTO-ENTMCNC: 32.9 G/DL (ref 33.6–35)
MCV RBC AUTO: 96 FL (ref 81.4–97.8)
MONOCYTES # BLD AUTO: 0.49 K/UL (ref 0–0.85)
MONOCYTES NFR BLD AUTO: 4.6 % (ref 0–13.4)
NEUTROPHILS # BLD AUTO: 8.04 K/UL (ref 2–7.15)
NEUTROPHILS NFR BLD: 74.9 % (ref 44–72)
NITRITE UR QL STRIP.AUTO: NEGATIVE
NRBC # BLD AUTO: 0 K/UL
NRBC BLD-RTO: 0 /100 WBC
PH UR STRIP.AUTO: 7.5 [PH]
PLATELET # BLD AUTO: 213 K/UL (ref 164–446)
PMV BLD AUTO: 10.5 FL (ref 9–12.9)
POTASSIUM SERPL-SCNC: 3.3 MMOL/L (ref 3.6–5.5)
PROT SERPL-MCNC: 5.6 G/DL (ref 6–8.2)
PROT UR QL STRIP: ABNORMAL MG/DL
RBC # BLD AUTO: 4.21 M/UL (ref 4.2–5.4)
RBC UR QL AUTO: NEGATIVE
SODIUM SERPL-SCNC: 138 MMOL/L (ref 135–145)
SP GR UR: 1.02
WBC # BLD AUTO: 10.7 K/UL (ref 4.8–10.8)

## 2019-02-02 PROCEDURE — 96372 THER/PROPH/DIAG INJ SC/IM: CPT

## 2019-02-02 PROCEDURE — 700111 HCHG RX REV CODE 636 W/ 250 OVERRIDE (IP): Performed by: SPECIALIST

## 2019-02-02 PROCEDURE — 36415 COLL VENOUS BLD VENIPUNCTURE: CPT

## 2019-02-02 PROCEDURE — 700105 HCHG RX REV CODE 258: Performed by: SPECIALIST

## 2019-02-02 PROCEDURE — 85025 COMPLETE CBC W/AUTO DIFF WBC: CPT

## 2019-02-02 PROCEDURE — 80053 COMPREHEN METABOLIC PANEL: CPT

## 2019-02-02 PROCEDURE — 76815 OB US LIMITED FETUS(S): CPT

## 2019-02-02 PROCEDURE — 81002 URINALYSIS NONAUTO W/O SCOPE: CPT

## 2019-02-02 PROCEDURE — 700105 HCHG RX REV CODE 258

## 2019-02-02 RX ORDER — SODIUM CHLORIDE, SODIUM LACTATE, POTASSIUM CHLORIDE, CALCIUM CHLORIDE 600; 310; 30; 20 MG/100ML; MG/100ML; MG/100ML; MG/100ML
INJECTION, SOLUTION INTRAVENOUS
Status: COMPLETED
Start: 2019-02-02 | End: 2019-02-02

## 2019-02-02 RX ORDER — TERBUTALINE SULFATE 1 MG/ML
0.25 INJECTION, SOLUTION SUBCUTANEOUS ONCE
Status: COMPLETED | OUTPATIENT
Start: 2019-02-02 | End: 2019-02-02

## 2019-02-02 RX ORDER — TERBUTALINE SULFATE 1 MG/ML
INJECTION, SOLUTION SUBCUTANEOUS
Status: COMPLETED
Start: 2019-02-02 | End: 2019-02-02

## 2019-02-02 RX ORDER — SODIUM CHLORIDE, SODIUM LACTATE, POTASSIUM CHLORIDE, CALCIUM CHLORIDE 600; 310; 30; 20 MG/100ML; MG/100ML; MG/100ML; MG/100ML
INJECTION, SOLUTION INTRAVENOUS CONTINUOUS
Status: DISCONTINUED | OUTPATIENT
Start: 2019-02-02 | End: 2019-02-03 | Stop reason: HOSPADM

## 2019-02-02 RX ORDER — MISOPROSTOL 200 UG/1
TABLET ORAL
Status: COMPLETED
Start: 2019-02-02 | End: 2019-02-02

## 2019-02-02 RX ADMIN — SODIUM CHLORIDE, POTASSIUM CHLORIDE, SODIUM LACTATE AND CALCIUM CHLORIDE 125 ML: 600; 310; 30; 20 INJECTION, SOLUTION INTRAVENOUS at 18:24

## 2019-02-02 RX ADMIN — SODIUM CHLORIDE, POTASSIUM CHLORIDE, SODIUM LACTATE AND CALCIUM CHLORIDE: 600; 310; 30; 20 INJECTION, SOLUTION INTRAVENOUS at 15:21

## 2019-02-02 RX ADMIN — TERBUTALINE SULFATE 0.25 MG: 1 INJECTION, SOLUTION SUBCUTANEOUS at 15:03

## 2019-02-02 ASSESSMENT — PATIENT HEALTH QUESTIONNAIRE - PHQ9
1. LITTLE INTEREST OR PLEASURE IN DOING THINGS: NOT AT ALL
SUM OF ALL RESPONSES TO PHQ9 QUESTIONS 1 AND 2: 0
2. FEELING DOWN, DEPRESSED, IRRITABLE, OR HOPELESS: NOT AT ALL

## 2019-02-02 NOTE — PROGRESS NOTES
"28 y.o.  EDC 19 EGA 19.3 here to Room 225 with friend \"Susie\" c/o sharp shooting intermittent abdominal pain since last night also accompanied by lower back pain this morning. Pt reports she has been experiencing fetal movement with this pregnancy. Denies vaginal bleeding, denies recent sexual activity. Reports some nausea today but no vomiting. Hx of 2 , and 1 primary C/S for failure to progress in 2017.   Clean catch urine sample obtained and dipped. Urine yellow and cloudy. SG 1.020, no blood, no nitrites, no ketones, no leukocytes.   Pt on hands and knees on bed, grunting. Rating pain 7-8/10.   Doppler 's, toco applied.   1250 Pt hips up on pillow. Gentle SVE Mid/anterior position/ 1-2 cm outer os.   Report to Dr. Campos, order for transvag US with cervical length.     US complete, cervical length 2.8cm via transvag measurement. EFW 294g, SILVIA 12.9cm, posterior placenta, tranverse fetal lie  1503- 0.25 mg Terbutaline given SQ in Right back arm.   IV started in left hand x1 attempt   Pt rating pain 8/10    1700 pt reporting decreased pain.   1900 Report to Suzy CALLAHAN RN.       "

## 2019-02-03 VITALS
HEIGHT: 66 IN | SYSTOLIC BLOOD PRESSURE: 121 MMHG | DIASTOLIC BLOOD PRESSURE: 56 MMHG | HEART RATE: 75 BPM | RESPIRATION RATE: 15 BRPM | TEMPERATURE: 97.6 F | BODY MASS INDEX: 19.29 KG/M2 | WEIGHT: 120 LBS

## 2019-02-03 PROCEDURE — 700105 HCHG RX REV CODE 258: Performed by: SPECIALIST

## 2019-02-03 PROCEDURE — 96372 THER/PROPH/DIAG INJ SC/IM: CPT

## 2019-02-03 PROCEDURE — 700111 HCHG RX REV CODE 636 W/ 250 OVERRIDE (IP): Performed by: SPECIALIST

## 2019-02-03 RX ORDER — ACETAMINOPHEN 325 MG/1
650 TABLET ORAL EVERY 4 HOURS PRN
Status: DISCONTINUED | OUTPATIENT
Start: 2019-02-03 | End: 2019-02-03 | Stop reason: HOSPADM

## 2019-02-03 RX ORDER — TERBUTALINE SULFATE 1 MG/ML
0.25 INJECTION, SOLUTION SUBCUTANEOUS ONCE
Status: DISCONTINUED | OUTPATIENT
Start: 2019-02-03 | End: 2019-02-03 | Stop reason: HOSPADM

## 2019-02-03 RX ORDER — TERBUTALINE SULFATE 1 MG/ML
INJECTION, SOLUTION SUBCUTANEOUS
Status: DISCONTINUED
Start: 2019-02-03 | End: 2019-02-03 | Stop reason: HOSPADM

## 2019-02-03 RX ORDER — TERBUTALINE SULFATE 1 MG/ML
0.25 INJECTION, SOLUTION SUBCUTANEOUS ONCE
Status: COMPLETED | OUTPATIENT
Start: 2019-02-03 | End: 2019-02-03

## 2019-02-03 RX ADMIN — TERBUTALINE SULFATE 0.25 MG: 1 INJECTION, SOLUTION SUBCUTANEOUS at 08:13

## 2019-02-03 RX ADMIN — SODIUM CHLORIDE, POTASSIUM CHLORIDE, SODIUM LACTATE AND CALCIUM CHLORIDE: 600; 310; 30; 20 INJECTION, SOLUTION INTRAVENOUS at 02:27

## 2019-02-03 ASSESSMENT — PATIENT HEALTH QUESTIONNAIRE - PHQ9
1. LITTLE INTEREST OR PLEASURE IN DOING THINGS: NOT AT ALL
2. FEELING DOWN, DEPRESSED, IRRITABLE, OR HOPELESS: NOT AT ALL
SUM OF ALL RESPONSES TO PHQ9 QUESTIONS 1 AND 2: 0

## 2019-02-03 NOTE — PROGRESS NOTES
Re[rt received. Pt is sleeping.  0800 pt calls with C/O back pain ad cotractions. Strip reviewed ad she there are UCs Q2 in, Dr Goode was called report given ad order received to give a dose of terb.  0816 one dose of SQ terb was given. SVE done 1 cm thick and and high.  0930 Dr Campos in spoke to pt and order received to discharge pt at 23 hour of stay.  Doppler is 150's and audible movements.  1000 pt is comfortable now.   1050 pt is sleeping.  1200 IV was DCed and instructions given. Pt understands. Will leave after finishing her lunch.  1216 pt is comfortable, denies any pain or contractions. Pt left on WC with her .

## 2019-02-03 NOTE — PROGRESS NOTES
Progress Note    Subjective:   Doing well. No issues or concerns. She has received two doses of Terbutaline and is only having min cramping. She reports good fetal movement. No increased vaginal discharge. No bleeding or spotting. She was re examined by the nurse this am and has made no cervical change over pm this am.  Objective Data:  Recent Labs      19   1520   WBC  10.7   RBC  4.21   HEMOGLOBIN  13.3   HEMATOCRIT  40.4   MCV  96.0   MCH  31.6   MCHC  32.9*   RDW  48.1   PLATELETCT  213   MPV  10.5     Recent Labs      19   1652   SODIUM  138   POTASSIUM  3.3*   CHLORIDE  110   CO2  21   GLUCOSE  93   BUN  6*   CREATININE  0.56   CALCIUM  8.5       Vitals:    19 1934 19 2331 19 0329 19 0800   BP: 111/54 105/53 103/59 121/56   Pulse: 75 66 (!) 56 75   Resp: 16 16 16 15   Temp: 37 °C (98.6 °F) 36.5 °C (97.7 °F) 36.6 °C (97.8 °F) 36.4 °C (97.6 °F)   TempSrc: Temporal Temporal Temporal Temporal   Weight:       Height:         Abdomen: soft non tender fundus at umbilicus  Perineum: deferred exam  Ext:Non tender calves    FHTS: 150s   [place]: min irritability  No intake or output data in the 24 hours ending 19 0956    Current Facility-Administered Medications   Medication Dose Route Frequency Provider Last Rate Last Dose   • TERBUTALINE SULFATE 1 MG/ML INJ SOLN            • terbutaline (BRETHINE) injection 0.25 mg  0.25 mg Subcutaneous Once [place] Campos M.D.       • acetaminophen (TYLENOL) tablet 650 mg  650 mg Oral Q4HRS PRN [place] Campos M.D.       • lactated ringers infusion   Intravenous Continuous [place] Campos M.D. 125 mL/hr at 19 0227         A/P A 28-year-old  6, para 3, at 19-4/7th weeks gestation who wishes to proceed with the plan to go home on modified bedrest. No e/o cervical change. She will proceed with the discharge home with f/u with our office in the am. All questions were answered and precautions were given.

## 2019-02-03 NOTE — CARE PLAN
Problem: Risk for Infection, Impaired Wound Healing  Goal: Remain free from signs and symptoms of infection  Outcome: PROGRESSING AS EXPECTED  No signs of infection. Pt is afebrile at this time.     Problem: Pain  Goal: Alleviation of Pain or a reduction in pain to the patient's comfort goal  Outcome: PROGRESSING AS EXPECTED  Pt denies discomfort at this time.

## 2019-02-03 NOTE — H&P
REASON FOR ADMISSION:  Abdominal and pelvic pain with increased uterine   activity at 19-3/7th weeks gestation.    HISTORY OF PRESENT ILLNESS:  This is a 28-year-old  6, para 3, at   19-3/7th weeks gestation, who during the course of her early pregnancy at 12   weeks' gestation was complaining of similar type pain, at which time she was   seen and evaluated in the emergency room.  Her ultrasound was unremarkable, as   was her workup.  Patient was felt to have pelvic and musculoskeletal pain   related to multiple pregnancies.  She had no evidence of leukocytosis, fever,   or active labor.  She was given a small prescription of [place] and was sent   home.  During the course of this pregnancy, the patient has undergone a workup   including cervical cultures and prenatal care labs in addition to pelvic   ultrasound.  These studies were unremarkable and on the day of presentation,   the patient was complaining of lower abdominal and pelvic pain.  Her   urinalysis was unremarkable.  She did have a CBC done, which was white blood   cell count of 10.7.  On fetal heart rate tracing, she had overall reassuring   fetal status with heart tones noted and contractions approximately every 2-3   minutes.  Patient did have an ultrasound, which did demonstrate   transabdominally possible funneling; however, transvaginal ultrasound was   performed and had a cervical length of 2.8 with no evidence of funneling.    Most likely transabdominally, a contraction was noted at the time of   measurement.  Given these findings, we will plan on proceeding forward with   admission, observation, and possible tocolytic therapy if indicated at this   time.    PAST MEDICAL HISTORY:  Migraine headaches, depression, with a history of   postpartum psychosis in 2017.    PAST SURGICAL HISTORY:   section in 2017.    OBSTETRICAL HISTORY:  In  and , patient had spontaneous  at 13   and 8 weeks respectively.  In , at 40-2/7th  weeks' gestation, patient had   a 7 pound 2 ounce infant.  In  at 39 weeks' gestation, also in [place],   patient had a 7-1/2 pound infant.  In 2017 at 38 weeks gestation in [place],   the patient had induction of labor, 7 pound 13 ounce infant, complicated by   depression and postpartum psychosis.  This is her 6th pregnancy.    SOCIAL HISTORY:  She denies use of any alcohol, tobacco, or recreational drug   use.    MEDICATIONS:  Prenatal vitamins.    ALLERGIES:  ZOLOFT AND REGLAN.    PHYSICAL EXAMINATION:  VITAL SIGNS:  She is afebrile, hemodynamically stable.  HEART:  Regular rate and rhythm.  CHEST:  Clear to auscultation bilaterally.  ABDOMEN:  Soft, gravid, nontender.  PELVIC:  Sterile vaginal exam was consistent with a paracervix, which did   appear to be long, with the transabdominal and transvaginal ultrasound images   as stated above.    LABORATORY DATA:  Prenatal care labs were all in order.    ASSESSMENT AND PLAN:  A 28-year-old  6, para 3, at 19-3/7th weeks   gestation, presents to labor and delivery with complaints of frequent regular   painful uterine contractions.  We will plan on proceeding forward with   admission, IV fluid resuscitation, tocolytic therapy with 1 dose of   terbutaline, and we will observe the patient closely.  Should there be   evidence of resolution of her contractions and improvement of her pain over an   observation of 24 hours, we will plan on considering discharging the patient   with modified bed rest and close followup.  Overall, reassuring fetal status   has been appreciated.  The patient states that she does understand the plan   and will proceed forward with admission at this time.       ____________________________________     MD LUCERO VILLA / MALU    DD:  2019 16:53:40  DT:  2019 17:20:20    D#:  [id number]  Job#:  [id number]

## 2019-02-03 NOTE — CARE PLAN
Problem: Knowledge Deficit  Goal: Patient/Support Person demonstrates understanding regarding condition, prognosis and treatment needs during the pregnancy  Outcome: PROGRESSING AS EXPECTED  POC was discussed with pt and family. Discharge instructions given.    Problem: Powerlessness  Goal: Patient will verbalize increased feelings of control or understanding  Outcome: PROGRESSING AS EXPECTED  Pt is going home and feels very good with knowledge that she gained here.

## 2019-02-09 ENCOUNTER — HOSPITAL ENCOUNTER (OUTPATIENT)
Facility: MEDICAL CENTER | Age: 29
End: 2019-02-09
Attending: SPECIALIST | Admitting: SPECIALIST
Payer: OTHER GOVERNMENT

## 2019-02-09 VITALS
SYSTOLIC BLOOD PRESSURE: 111 MMHG | WEIGHT: 124 LBS | HEART RATE: 72 BPM | DIASTOLIC BLOOD PRESSURE: 57 MMHG | TEMPERATURE: 99.3 F | BODY MASS INDEX: 19.93 KG/M2 | HEIGHT: 66 IN

## 2019-02-09 PROCEDURE — 81002 URINALYSIS NONAUTO W/O SCOPE: CPT

## 2019-02-09 PROCEDURE — 96372 THER/PROPH/DIAG INJ SC/IM: CPT

## 2019-02-09 PROCEDURE — 700111 HCHG RX REV CODE 636 W/ 250 OVERRIDE (IP): Performed by: SPECIALIST

## 2019-02-09 RX ORDER — TERBUTALINE SULFATE 1 MG/ML
0.25 INJECTION, SOLUTION SUBCUTANEOUS ONCE
Status: COMPLETED | OUTPATIENT
Start: 2019-02-09 | End: 2019-02-09

## 2019-02-09 RX ORDER — TERBUTALINE SULFATE 1 MG/ML
0.25 INJECTION, SOLUTION SUBCUTANEOUS ONCE
Status: DISCONTINUED | OUTPATIENT
Start: 2019-02-09 | End: 2019-02-09 | Stop reason: HOSPADM

## 2019-02-09 RX ADMIN — TERBUTALINE SULFATE 0.25 MG: 1 INJECTION, SOLUTION SUBCUTANEOUS at 15:32

## 2019-02-11 LAB
APPEARANCE UR: CLEAR
COLOR UR AUTO: YELLOW
GLUCOSE UR QL STRIP.AUTO: NEGATIVE MG/DL
KETONES UR QL STRIP.AUTO: NEGATIVE MG/DL
LEUKOCYTE ESTERASE UR QL STRIP.AUTO: NEGATIVE
NITRITE UR QL STRIP.AUTO: NEGATIVE
PH UR STRIP.AUTO: 7 [PH]
PROT UR QL STRIP: NEGATIVE MG/DL
RBC UR QL AUTO: ABNORMAL
SP GR UR: 1.01

## 2019-03-27 ENCOUNTER — HOSPITAL ENCOUNTER (OUTPATIENT)
Dept: LAB | Facility: MEDICAL CENTER | Age: 29
End: 2019-03-27
Attending: SPECIALIST
Payer: MEDICAID

## 2019-03-27 LAB — FIBRONECTIN FETAL SPEC QL: NEGATIVE

## 2019-03-27 PROCEDURE — 82731 ASSAY OF FETAL FIBRONECTIN: CPT

## 2019-03-28 ENCOUNTER — HOSPITAL ENCOUNTER (OUTPATIENT)
Facility: MEDICAL CENTER | Age: 29
End: 2019-03-28
Attending: SPECIALIST | Admitting: SPECIALIST
Payer: MEDICAID

## 2019-03-28 VITALS
DIASTOLIC BLOOD PRESSURE: 60 MMHG | HEART RATE: 83 BPM | SYSTOLIC BLOOD PRESSURE: 119 MMHG | TEMPERATURE: 98.1 F | RESPIRATION RATE: 16 BRPM

## 2019-03-28 LAB
APPEARANCE UR: CLEAR
COLOR UR AUTO: YELLOW
GLUCOSE UR QL STRIP.AUTO: NEGATIVE MG/DL
KETONES UR QL STRIP.AUTO: NEGATIVE MG/DL
LEUKOCYTE ESTERASE UR QL STRIP.AUTO: NEGATIVE
NITRITE UR QL STRIP.AUTO: NEGATIVE
PH UR STRIP.AUTO: 8 [PH]
PROT UR QL STRIP: NEGATIVE MG/DL
RBC UR QL AUTO: NEGATIVE
SP GR UR: 1.01

## 2019-03-28 PROCEDURE — 81002 URINALYSIS NONAUTO W/O SCOPE: CPT

## 2019-03-28 NOTE — PROGRESS NOTES
EDC  27      0915-pt presents from home with c/o pelvic and back pain, states that she saw the NP at Dr Campos's office yesterday and they ran an fFn and did a cervical length at 3.8 cm, states that the pain is still there and is concerned about labor, no c/o leaking or bleeding, states baby is moving normally, placed on external monitors, vs taken, ua dipped negative, water given to drink, SVE 1/thick/floating, fFn from yesterday back negative  1015-TC Dr Caruso, report given, discharge order received  1020-pt discharged home with PTL precautions also discussed maternity support belt, verbalized understanding, left ambulatory on her own,

## 2019-04-17 ENCOUNTER — HOSPITAL ENCOUNTER (OUTPATIENT)
Facility: MEDICAL CENTER | Age: 29
End: 2019-04-18
Attending: SPECIALIST | Admitting: SPECIALIST
Payer: MEDICAID

## 2019-04-17 LAB
APPEARANCE UR: ABNORMAL
COLOR UR AUTO: YELLOW
GLUCOSE UR QL STRIP.AUTO: NEGATIVE MG/DL
KETONES UR QL STRIP.AUTO: NEGATIVE MG/DL
LEUKOCYTE ESTERASE UR QL STRIP.AUTO: NEGATIVE
NITRITE UR QL STRIP.AUTO: NEGATIVE
PH UR STRIP.AUTO: 7.5 [PH]
PROT UR QL STRIP: NEGATIVE MG/DL
RBC UR QL AUTO: NEGATIVE
SP GR UR: 1.02

## 2019-04-17 PROCEDURE — 81002 URINALYSIS NONAUTO W/O SCOPE: CPT

## 2019-04-17 PROCEDURE — 82731 ASSAY OF FETAL FIBRONECTIN: CPT

## 2019-04-18 VITALS
DIASTOLIC BLOOD PRESSURE: 72 MMHG | SYSTOLIC BLOOD PRESSURE: 123 MMHG | WEIGHT: 128 LBS | HEART RATE: 86 BPM | BODY MASS INDEX: 20.57 KG/M2 | TEMPERATURE: 97.7 F | HEIGHT: 66 IN

## 2019-04-18 LAB — FIBRONECTIN FETAL SPEC QL: NEGATIVE

## 2019-04-18 PROCEDURE — 700111 HCHG RX REV CODE 636 W/ 250 OVERRIDE (IP): Performed by: SPECIALIST

## 2019-04-18 PROCEDURE — 96372 THER/PROPH/DIAG INJ SC/IM: CPT

## 2019-04-18 PROCEDURE — 59025 FETAL NON-STRESS TEST: CPT

## 2019-04-18 RX ORDER — TERBUTALINE SULFATE 1 MG/ML
0.25 INJECTION, SOLUTION SUBCUTANEOUS ONCE
Status: COMPLETED | OUTPATIENT
Start: 2019-04-18 | End: 2019-04-18

## 2019-04-18 RX ADMIN — TERBUTALINE SULFATE 0.25 MG: 1 INJECTION, SOLUTION SUBCUTANEOUS at 00:17

## 2019-04-18 NOTE — PROGRESS NOTES
222 28 y/o, , EDC 2019, EGA 30.0. Pt here for Vaginal Pressure since  . Pt to room 217 with friend Susie . Pt reports +FM, denies UCs,and vaginal LOF/bleeding.    Dr. Campos phoned, report given. Orders received to collect a FFN and preform SVE   Sterile spec exam preformed and FFN collected. SVE= 1/thick/high  0007 Dr. Campos phoned. Report given. Orders received for Terbutaline, see MAR  0100 Dr. Campos phoned. Report given. Orders received to discharge pt and to have pt make a follow up appointment in am.   0110 Discharge instructions given including labor precautions, UCs, ROM, VB, abn fm/fetal kick counts, fluid intake, when to return to L&D and follow up with Dr. Campos this morning. Questions answered and Pt verbalized agreement with POC and discharge. Discharge paperwork signed. Pt discharged via ambulation in stable condition with friend Susie and personal belongings.

## 2019-04-18 NOTE — DISCHARGE INSTRUCTIONS
Hemorrhoids  Introduction  Hemorrhoids are swollen veins in and around the rectum or anus. Hemorrhoids can cause pain, itching, or bleeding. Most of the time, they do not cause serious problems. They usually get better with diet changes, lifestyle changes, and other home treatments.  Follow these instructions at home:  Eating and drinking  · Eat foods that have fiber, such as whole grains, beans, nuts, fruits, and vegetables. Ask your doctor about taking products that have added fiber (fiber supplements).  · Drink enough fluid to keep your pee (urine) clear or pale yellow.  For Pain and Swelling  · Take a warm-water bath (sitz bath) for 20 minutes to ease pain. Do this 3-4 times a day.  · If directed, put ice on the painful area. It may be helpful to use ice between your warm baths.  ¨ Put ice in a plastic bag.  ¨ Place a towel between your skin and the bag.  ¨ Leave the ice on for 20 minutes, 2-3 times a day.  General instructions  · Take over-the-counter and prescription medicines only as told by your doctor.  ¨ Medicated creams and medicines that are inserted into the anus (suppositories) may be used or applied as told.  · Exercise often.  · Go to the bathroom when you have the urge to poop (to have a bowel movement). Do not wait.  · Avoid pushing too hard (straining) when you poop.  · Keep the butt area dry and clean. Use wet toilet paper or moist paper towels.  · Do not sit on the toilet for a long time.  Contact a doctor if:  · You have any of these:  ¨ Pain and swelling that do not get better with treatment or medicine.  ¨ Bleeding that will not stop.  ¨ Trouble pooping or you cannot poop.  ¨ Pain or swelling outside the area of the hemorrhoids.  This information is not intended to replace advice given to you by your health care provider. Make sure you discuss any questions you have with your health care provider.  Document Released: 09/26/2009 Document Revised: 05/25/2017 Document Reviewed: 08/31/2016  ©  2017 [employer]    Pre-term Labor (<37 weeks):  Call your physician or return to the hospital if:  · You have painless regular contractions more than 4 in one hour.  · Your water breaks (remember time and color).  · You have menstrual-like cramps, a low dull backache or pressure in your pelvis or back.  · Your baby does not move enough to complete the daily kick count (10 movements in 2 hours).  · Your baby moves much less often than on the days before or you have not felt your baby move all day.  · Please review the MEDICATION LIST section of your AFTER VISIT SUMMARY document.  · Take your medication as prescribed

## 2019-04-21 ENCOUNTER — HOSPITAL ENCOUNTER (OUTPATIENT)
Facility: MEDICAL CENTER | Age: 29
End: 2019-04-21
Attending: SPECIALIST | Admitting: SPECIALIST
Payer: MEDICAID

## 2019-04-21 ENCOUNTER — APPOINTMENT (OUTPATIENT)
Dept: RADIOLOGY | Facility: MEDICAL CENTER | Age: 29
End: 2019-04-21
Attending: SPECIALIST
Payer: MEDICAID

## 2019-04-21 VITALS
SYSTOLIC BLOOD PRESSURE: 116 MMHG | DIASTOLIC BLOOD PRESSURE: 66 MMHG | HEIGHT: 66 IN | BODY MASS INDEX: 20.25 KG/M2 | WEIGHT: 126 LBS | HEART RATE: 99 BPM

## 2019-04-21 VITALS
DIASTOLIC BLOOD PRESSURE: 62 MMHG | OXYGEN SATURATION: 100 % | HEIGHT: 67 IN | BODY MASS INDEX: 19.78 KG/M2 | HEART RATE: 87 BPM | SYSTOLIC BLOOD PRESSURE: 105 MMHG | WEIGHT: 126 LBS

## 2019-04-21 LAB — FIBRONECTIN FETAL SPEC QL: NEGATIVE

## 2019-04-21 PROCEDURE — 82731 ASSAY OF FETAL FIBRONECTIN: CPT

## 2019-04-21 PROCEDURE — 700105 HCHG RX REV CODE 258: Performed by: SPECIALIST

## 2019-04-21 PROCEDURE — 76819 FETAL BIOPHYS PROFIL W/O NST: CPT

## 2019-04-21 PROCEDURE — 59025 FETAL NON-STRESS TEST: CPT

## 2019-04-21 PROCEDURE — 59025 FETAL NON-STRESS TEST: CPT | Mod: XU

## 2019-04-21 RX ORDER — TERBUTALINE SULFATE 1 MG/ML
0.25 INJECTION, SOLUTION SUBCUTANEOUS ONCE
Status: DISCONTINUED | OUTPATIENT
Start: 2019-04-21 | End: 2019-04-21 | Stop reason: HOSPADM

## 2019-04-21 RX ORDER — SODIUM CHLORIDE, SODIUM LACTATE, POTASSIUM CHLORIDE, AND CALCIUM CHLORIDE .6; .31; .03; .02 G/100ML; G/100ML; G/100ML; G/100ML
1000 INJECTION, SOLUTION INTRAVENOUS ONCE
Status: COMPLETED | OUTPATIENT
Start: 2019-04-21 | End: 2019-04-21

## 2019-04-21 RX ADMIN — SODIUM CHLORIDE, POTASSIUM CHLORIDE, SODIUM LACTATE AND CALCIUM CHLORIDE 1000 ML: 600; 310; 30; 20 INJECTION, SOLUTION INTRAVENOUS at 20:20

## 2019-04-21 NOTE — PROGRESS NOTES
1640 Pt here with complaints of cramping today. Pt States she has had uc's off and on since 19 weeks. Pt to triage #5 oriented to room and call system. Friend and FOB at bedside. External monitors placed, VS taken and WNL. Pt states she was here last week and was given a shot of terbutaline. UA obtained , no abnormals noted.  Sterile spec exam done. FFN sent to lab as well as GBS. SVE 1cm long firm. SQ terb 0.25 mg given to Left back of arm. MD updated. Pt discharged to home.

## 2019-04-22 NOTE — DISCHARGE INSTRUCTIONS
General Instructions:  · If you think you are in labor, time contractions (lying on your left side) from the beginning of one contraction to the beginning of the next contraction for at least one hour.  · Increase fluid intake: you should consume 10-12 8 oz glasses of non-caffeinated fluid per day.  · Report any pressure or burning on urination to your physician.  · Monitor fetal movement: If you notice an absence or decrease in fetal movement, drink a large glass of water and rest on your side.  If there is no increase in movement, call your physician or go to the hospital for further evaluation.  · Report any sudden, sharp abdominal pain.  · Report any bleeding.  Spotting or pinkish discharge is normal after vaginal exam.  You may also spot after sexual intercourse.    Pre-term Labor (<37 weeks):  Call your physician or return to the hospital if:  · You have painless regular contractions more than 4 in one hour.  · Your water breaks (remember time and color).  · You have menstrual-like cramps, a low dull backache or pressure in your pelvis or back.  · Your baby does not move enough to complete the daily kick count (10 movements in 2 hours).  · Your baby moves much less often than on the days before or you have not felt your baby move all day.  · Please review the MEDICATION LIST section of your AFTER VISIT SUMMARY document.  · Take your medication as prescribed      Other Instructions:  Call Dr. Campos office to set up appointment asap  Please carefully review your entire AFTER VISIT SUMMARY document for all discharge instructions.

## 2019-04-22 NOTE — PROGRESS NOTES
"- pt is a , 30.4 weeks gestation IUP, with c/o generalized weakness, SOB, fainting while in car after being discharged. Pt was discharged at 1900 after labor evaluation and terbutaline given (see previous record). pt 1 person assist from wheelchair to bed, pt stating \"I am too weak to do it myself\". EFM and TOCO placed. BP low, tachycardic, and spO2 wnl.   - Dr. Chacon called and updated of pt arriving back after dc, c/o,  plus auditory irregular FHT detected. Received orders for BPP and iv hydration x1 liter LR.  - iv started, 20g RFA, LR infusing  - Dr. Chacon called in to check on pt, updated on pt more alert but still feeling weak, LR infusing, and US tech just arrived for BPP. Orders to call with abnormal results.  - Dr. Chacon called with BPP results  and cervical length of 2.4cm. Received orders to discharge and instruct pt to go to ER to be evaluated. Discussed poc with pt, all questions answered, verbalized understanding. Monitors removed, left to change into clothes.  - addressed pre term labor discharge instructions, all questions answered. Left off floor with SO and wheelchair at side for safety after pt states \"I have been sitting most of today, I would rather walk\".   "

## 2019-04-22 NOTE — DISCHARGE INSTRUCTIONS
General Instructions:  · If you think you are in labor, time contractions (lying on your left side) from the beginning of one contraction to the beginning of the next contraction for at least one hour.  · Increase fluid intake: you should consume 10-12 8 oz glasses of non-caffeinated fluid per day.  · Report any pressure or burning on urination to your physician.  · Monitor fetal movement: If you notice an absence or decrease in fetal movement, drink a large glass of water and rest on your side.  If there is no increase in movement, call your physician or go to the hospital for further evaluation.  · Report any sudden, sharp abdominal pain.  · Report any bleeding.  Spotting or pinkish discharge is normal after vaginal exam.  You may also spot after sexual intercourse.    Pre-term Labor (<37 weeks):  Call your physician or return to the hospital if:  · You have painless regular contractions more than 4 in one hour.  · Your water breaks (remember time and color).  · You have menstrual-like cramps, a low dull backache or pressure in your pelvis or back.  · Your baby does not move enough to complete the daily kick count (10 movements in 2 hours).  · Your baby moves much less often than on the days before or you have not felt your baby move all day.  · Please review the MEDICATION LIST section of your AFTER VISIT SUMMARY document.  · Take your medication as prescribed      Other Instructions:  Please carefully review your entire AFTER VISIT SUMMARY document for all discharge instructions.Discharge to home. Call Dr Campos for follow up in am.

## 2019-07-09 ENCOUNTER — OFFICE VISIT (OUTPATIENT)
Dept: MEDICAL GROUP | Facility: MEDICAL CENTER | Age: 29
End: 2019-07-09
Attending: NURSE PRACTITIONER
Payer: MEDICAID

## 2019-07-09 ENCOUNTER — TELEPHONE (OUTPATIENT)
Dept: OBGYN | Facility: CLINIC | Age: 29
End: 2019-07-09

## 2019-07-09 VITALS
WEIGHT: 108 LBS | HEIGHT: 65 IN | RESPIRATION RATE: 14 BRPM | HEART RATE: 100 BPM | DIASTOLIC BLOOD PRESSURE: 80 MMHG | SYSTOLIC BLOOD PRESSURE: 120 MMHG | BODY MASS INDEX: 17.99 KG/M2 | OXYGEN SATURATION: 94 % | TEMPERATURE: 98.2 F

## 2019-07-09 DIAGNOSIS — G44.229 CHRONIC TENSION-TYPE HEADACHE, NOT INTRACTABLE: ICD-10-CM

## 2019-07-09 DIAGNOSIS — F33.1 MODERATE EPISODE OF RECURRENT MAJOR DEPRESSIVE DISORDER (HCC): ICD-10-CM

## 2019-07-09 PROBLEM — Z3A.12 12 WEEKS GESTATION OF PREGNANCY: Status: RESOLVED | Noted: 2018-12-06 | Resolved: 2019-07-09

## 2019-07-09 PROCEDURE — 99214 OFFICE O/P EST MOD 30 MIN: CPT | Performed by: NURSE PRACTITIONER

## 2019-07-09 PROCEDURE — 99203 OFFICE O/P NEW LOW 30 MIN: CPT | Performed by: NURSE PRACTITIONER

## 2019-07-09 RX ORDER — METHOCARBAMOL 750 MG/1
TABLET, FILM COATED ORAL
Refills: 0 | COMMUNITY
Start: 2019-06-20

## 2019-07-09 RX ORDER — FLUOXETINE HYDROCHLORIDE 20 MG/1
20 CAPSULE ORAL DAILY
Qty: 30 CAP | Refills: 1 | Status: SHIPPED | OUTPATIENT
Start: 2019-07-09 | End: 2019-08-21 | Stop reason: SDUPTHER

## 2019-07-09 RX ORDER — NAPROXEN 500 MG/1
TABLET ORAL EVERY 12 HOURS PRN
Refills: 0 | COMMUNITY
Start: 2019-06-20 | End: 2019-10-01

## 2019-07-09 ASSESSMENT — ENCOUNTER SYMPTOMS
BLURRED VISION: 1
EYE DISCHARGE: 0
NERVOUS/ANXIOUS: 1
DEPRESSION: 1
SHORTNESS OF BREATH: 0
WHEEZING: 0
VOMITING: 0
DIARRHEA: 0
COUGH: 0
BLOOD IN STOOL: 0
TREMORS: 0
PALPITATIONS: 0
SINUS PAIN: 0
WEIGHT LOSS: 0
FEVER: 0
INSOMNIA: 0
DOUBLE VISION: 0
DIZZINESS: 0
CONSTIPATION: 0
HEADACHES: 1
NAUSEA: 0
ABDOMINAL PAIN: 0
CHILLS: 0
TINGLING: 0

## 2019-07-09 ASSESSMENT — PATIENT HEALTH QUESTIONNAIRE - PHQ9
CLINICAL INTERPRETATION OF PHQ2 SCORE: 4
5. POOR APPETITE OR OVEREATING: 3 - NEARLY EVERY DAY
SUM OF ALL RESPONSES TO PHQ QUESTIONS 1-9: 15

## 2019-07-09 NOTE — Clinical Note
Solo Stearns! I am a new provider over at the [facility].  I met this pt today.  She is 5 weeks postpartum with depression.  She has a hx of pp psychosis in 2015.  She is interested in seeing you ASAP.  I started her on some prozac today as she did not like the way zoloft made her feel in the past.  She is currently breastfeeding. Please let me know if I can provide any more info.  Thank you-Margaux

## 2019-07-09 NOTE — PROGRESS NOTES
Chief Complaint   Patient presents with   • Establish Care       Subjective:     HPI:   Mago Beauchamp is a 29 y.o. female here to establish care and to discuss the evaluation and management of:      Chronic tension headache  Onset: Began 3 days after delivery of son on 6/2/19.  Epidural with c section.  HA, daily since then. Some days she wakes up with HA, other days it gets progressively worse throughout the day.  Location: starts in occipital lobes bilateral and spreads to entire head.  Unlikely a post epidural HA considering length of time since epidural.  Character: Constant pounding. Denies photosensitive, reports mild phonosensitivity.  Associated symptoms: Occasional nausea.Denies changes in level of consciousness, nuchal rigidity. and abnormal motor function.  Aggravating factors: Loud kids and stress.  Alleviating factors: sleep  Treatments tried: naproxen and robaxin, zero relief with either.   Brain imaging in the past: CT head WDL on 12/18.  Previous Headache hx: After 3rd child, but once she got new glasses the HAs went away.  Recommend going to an optometrist to update eye exam/glasses.      Major depression  Reports increase in depression during pregnancy.  PHQ-9 is a 15.  Pt reports feeling bad about herself, little interest in things, and a depressed affect.  Denies suicidal ideation, reports distant hx of self harm.  Denies desire to harm children or others.  Hx of post partum psychosis in 2015, unable to remember medication she was on at the time.  In 2015, she brought herself in for tx in the ER, pt reports she does not feel that way now, but is concerned for the future.  Denies psychotic symptoms. She did not like the way she felt of zoloft in the past, but is willing to try prozac now.  CURRENTLY breast feeding.  Discussed going to ER if she feels unsafe, has self harm/suicidal ideation, or has desires to hurt others.  Pt verbalized understanding.       ROS  Review of Systems    Constitutional: Negative for chills, fever, malaise/fatigue and weight loss.   HENT: Negative for congestion, ear pain and sinus pain.    Eyes: Positive for blurred vision. Negative for double vision and discharge.        Blurred vision since 6/2/19, same onset as HA.    Respiratory: Negative for cough, shortness of breath and wheezing.    Cardiovascular: Negative for chest pain, palpitations and leg swelling.   Gastrointestinal: Negative for abdominal pain, blood in stool, constipation, diarrhea, melena, nausea and vomiting.   Genitourinary: Negative for dysuria, frequency and urgency.   Neurological: Positive for headaches. Negative for dizziness, tingling and tremors.   Endo/Heme/Allergies: Negative.    Psychiatric/Behavioral: Positive for depression. Negative for suicidal ideas. The patient is nervous/anxious. The patient does not have insomnia.          Allergies   Allergen Reactions   • Reglan [Metoclopramide]    • Zoloft [Sertraline] Anxiety       Current medicines (including changes today)  Current Outpatient Prescriptions   Medication Sig Dispense Refill   • naproxen (NAPROSYN) 500 MG Tab Take  by mouth every 12 hours as needed. Pain  0   • methocarbamol (ROBAXIN) 750 MG Tab TAKE 2 TABLETS BY MOUTH EVERY 6 HOURS AS NEEDED FOR MUSCLE SPASM  0   • FLUoxetine (PROZAC) 20 MG Cap Take 1 Cap by mouth every day. 30 Cap 1     No current facility-administered medications for this visit.      She  has a past medical history of Anxiety; ASTHMA; Depression; Head ache; Headache(784.0); and Miscarriage. She also has no past medical history of Ulcer.  She  has a past surgical history that includes tubal coagulation laparoscopic bilateral and primary c section.  Social History   Substance Use Topics   • Smoking status: Former Smoker     Quit date: 9/1/2018   • Smokeless tobacco: Never Used      Comment: 8 pack year hx   • Alcohol use No      Comment: quit 10/2011; once or twice a week prior to       Family History  "  Problem Relation Age of Onset   • Asthma Father    • Diabetes Paternal Grandmother      Family Status   Relation Status   • Mo Alive   • Fa Alive   • Bro Alive   • MGMo Alive   • MGFa Alive   • PGMo Alive   • PGFa Alive       Patient Active Problem List    Diagnosis Date Noted   • Major depression 08/01/2011     Priority: High   • Suicide attempt (HCC) 08/01/2011     Priority: High   • Acetaminophen overdose 08/01/2011     Priority: High   • Anticholinergic drug overdose 08/01/2011     Priority: High   • Chronic tension headache 07/09/2019   • Tobacco dependence 12/06/2018          Objective:     /80 (BP Location: Right arm, Patient Position: Sitting)   Pulse 100   Temp 36.8 °C (98.2 °F)   Resp 14   Ht 1.651 m (5' 5\")   Wt 49 kg (108 lb)   SpO2 94%  Body mass index is 17.97 kg/m².    Physical Exam:  Physical Exam   Constitutional: She is oriented to person, place, and time and well-developed, well-nourished, and in no distress. No distress.   HENT:   Head: Normocephalic.   Right Ear: External ear normal.   Left Ear: External ear normal.   Eyes: Pupils are equal, round, and reactive to light. Conjunctivae and EOM are normal.   Neck: Normal range of motion. Neck supple. No tracheal deviation present.   Cardiovascular: Normal rate, regular rhythm, normal heart sounds and intact distal pulses.    Pulmonary/Chest: Effort normal and breath sounds normal.   Abdominal: Soft. Bowel sounds are normal.   Musculoskeletal: Normal range of motion.   Lymphadenopathy:     She has no cervical adenopathy.   Neurological: She is alert and oriented to person, place, and time. Gait normal.   Skin: Skin is warm and dry.   Psychiatric: Affect and judgment normal.          Assessment and Plan:     The following treatment plan was discussed:    1. Chronic tension-type headache, not intractable  - Naproxen is not working so try tylenol 1000 mg every 8 hours up to three times a day. Discussed not exceeding 3 gm of tylenol is one " day.    -Go to optomotrist for new glasses.    2. Moderate episode of recurrent major depressive disorder (HCC)  REFERRAL TO PSYCHOLOGY  - start prozac 20mg.    REFERRAL TO behavioral Health- postpartum depression APRN       Any change or worsening of signs or symptoms, patient encouraged to follow-up or report to emergency room for further evaluation. Patient verbalizes understanding and agrees.    Follow-Up: Return in about 4 weeks (around 8/6/2019) for Medication check.      PLEASE NOTE: This dictation was created using voice recognition software. I have made every reasonable attempt to correct obvious errors, but I expect that there are errors of grammar and possibly content that I did not discover before finalizing the note.

## 2019-07-09 NOTE — ASSESSMENT & PLAN NOTE
Onset: Began 3 days after delivery of son on 6/2/19.  Epidural with c section.  HA, daily since then. Some days she wakes up with HA, other days it gets progressively worse throughout the day.  Location: starts in occipital lobes bilateral and spreads to entire head.  Unlikely a post epidural HA considering length of time since epidural.  Character: Constant pounding. Denies photosensitive, reports mild phonosensitivity.  Associated symptoms: Occasional nausea.Denies changes in level of consciousness, nuchal rigidity. and abnormal motor function.  Aggravating factors: Loud kids and stress.  Alleviating factors: sleep  Treatments tried: naproxen and robaxin, zero relief with either.   Brain imaging in the past: CT head WDL on 12/18.  Previous Headache hx: After 3rd child, but once she got new glasses the HAs went away.  Recommend going to an optometrist to update eye exam/glasses.

## 2019-07-09 NOTE — TELEPHONE ENCOUNTER
Referral from [facility], ABRAM Maldonado      Spoke with patient via phone.  verified.   Please put the patient on my scheduled on tomorrow 7/10/19 at 10:15, New patient, she will check in at 9:45am to complete ppwk. I have confirmed this date and time with the patient.   Thank you!  ABRAM Vital

## 2019-07-09 NOTE — PATIENT INSTRUCTIONS
1. Go to [facility] for new glasses.  2. Start taking prozac 20mg every third day for one week.  The next week take one prozac 20mg every other day.  The third week take one prozac 20mg every day.  STOP medication immediately if you feel more anxious.

## 2019-07-09 NOTE — ASSESSMENT & PLAN NOTE
Reports increase in depression during pregnancy.  PHQ-9 is a 15.  Pt reports feeling bad about herself, little interest in things, and a depressed affect.  Denies suicidal ideation, reports distant hx of self harm.  Denies desire to harm children or others.  Hx of post partum psychosis in 2015, unable to remember medication she was on at the time.  In 2015, she brought herself in for tx in the ER, pt reports she does not feel that way now, but is concerned for the future.  Denies psychotic symptoms. She did not like the way she felt of zoloft in the past, but is willing to try prozac now.  CURRENTLY breast feeding.  Discussed going to ER if she feels unsafe, has self harm/suicidal ideation, or has desires to hurt others.  Pt verbalized understanding.

## 2019-07-10 ENCOUNTER — GYNECOLOGY VISIT (OUTPATIENT)
Dept: OBGYN | Facility: CLINIC | Age: 29
End: 2019-07-10
Payer: MEDICAID

## 2019-07-10 VITALS
WEIGHT: 110 LBS | BODY MASS INDEX: 18.33 KG/M2 | HEIGHT: 65 IN | DIASTOLIC BLOOD PRESSURE: 76 MMHG | SYSTOLIC BLOOD PRESSURE: 120 MMHG

## 2019-07-10 DIAGNOSIS — F33.2 SEVERE EPISODE OF RECURRENT MAJOR DEPRESSIVE DISORDER, WITHOUT PSYCHOTIC FEATURES (HCC): ICD-10-CM

## 2019-07-10 DIAGNOSIS — Z91.51 HISTORY OF SUICIDE ATTEMPT: ICD-10-CM

## 2019-07-10 DIAGNOSIS — F41.1 GAD (GENERALIZED ANXIETY DISORDER): ICD-10-CM

## 2019-07-10 DIAGNOSIS — R45.851 SUICIDAL IDEATION: ICD-10-CM

## 2019-07-10 DIAGNOSIS — F60.3 BORDERLINE PERSONALITY DISORDER (HCC): ICD-10-CM

## 2019-07-10 PROBLEM — F17.200 TOBACCO DEPENDENCE: Status: RESOLVED | Noted: 2018-12-06 | Resolved: 2019-07-10

## 2019-07-10 PROCEDURE — 99215 OFFICE O/P EST HI 40 MIN: CPT | Performed by: NURSE PRACTITIONER

## 2019-07-10 NOTE — PATIENT INSTRUCTIONS
Your care was provided today by: ABRAM Vital    Thank You for the opportunity to serve you.    You may receive a brief survey in the mail shortly regarding your visit today. Please take a few moments to complete the survey and return it; no postage is necessary. We are working to serve our patient population better, improve customer service and our patients overall experience and your input can help us to accomplish this. We thank you for your help and for the opportunity to serve you today and in the future.     Labs and Diagnostic Testing   Please note that if we have ordered labs or diagnostic testing, those results may be released to you on [employer] prior to my review. While we do our best to review your results as soon as possible, there are times when you may see your results prior to provider review. Of course, if you ever have any questions or concerns about your results, please contact our clinic.     Special Instructions:  Always call 9-1-1 immediately if you develop a life threatening emergency.  You may also contact the National Suicide Prevention Hotline: 8-101-542-XEPQ    If you or a friend are looking for resources regarding postpartum depression or anxiety, you may call [employer] at 1-932.398.1262, #1 en [place], #2 for English. You may also text [phone]. You may also visit their web site: http://www.postpartum.net/    Unless told otherwise please take all medications as directed and complete prescription therapies. If you ever have concerns or questions, please contact our office.     Watch for the following signs that require additional evaluation: progressive lethargy or unresponsiveness, localized pain (chest, abdomen), shortness of breath, painful breathing, progressive vomiting with weakness, bloody stools, or new rash.

## 2019-07-10 NOTE — PROGRESS NOTES
"Subjective:     Mago Beauchamp is a 29 y.o. female here today for evaluation and management of the following:     Date of assessment:   PCP: JACKIE Dunne.  Persons in attendance: Patient, baby boy, 5 weeks   Total face-to-face time: 45 minutes    Borderline personality disorder (HCC)  Diagnosed , [place]     History of suicide attempt  Age 15 years, then in , per patient took pills     Suicidal ideation  Patient reports suicidal ideation \"comes and goes.  \".  She reports that when she is under a lot of stress she begins to think about suicide although she reports that she would never hurt herself as she wants to be her for all her children.    Severe episode of recurrent major depressive disorder, without psychotic features (HCC)  7/10/2019  Referred by: PCP, ABRAM Maldonado      Pregnancy/Birth History: Patient recently gave birth to her fourth child.  She has 4 children ages 4, 3, 2, 5 weeks.  HER-2 oldest were delivered vaginally, girls.  Her 2 youngest were delivered via , boys.  Patient reports that she was planning a , reports that she went into labor at 36 weeks with her most recent baby, then found out baby was breech and had an emergency .  She does admit that this was a very traumatic experience for her.  She reports that she has been physically healing fairly well although she has had increased headaches since her baby was born.  She is utilizing Excedrin Migraine as needed and has also seen her primary care provider.  She also plans to obtain better glasses which she believes is contributing to her headaches.  Patient reports that she likely had a history of postpartum depression after her first 2 children but most severe after her third child.  At the time she was initiated on Zoloft although she reports this made her much more anxious even after the first dose.  She did not follow-up in regards to treatment since that time.  Patient did have a tubal " "ligation, not planning any future children.    History of depression/anxiety: Patient reports an extensive history of depression and anxiety.  Patient was last hospitalized in 2011, [place], patient reports she was initiated on medications at the time although she is not sure the name.  She does believe at least one with Seroquel which only made her sleep.  Patient reports a history of suicide attempt also at age 15 where she was hospitalized at the time.  Patient reports she has been diagnosed with depression and borderline personality disorder.  She believes Zoloft made her anxious in the past.  She is not currently seeing a therapist, her PCP recently just initiated her on Prozac of which she is only taken 1 dose, patient reports it does not appear to be making her more anxious.    Feeding: Patient is currently breast-feeding exclusively, reports this is going well, baby is gaining and thriving.  She breast-fed her previous children for approximately 3 to 7 months.    Partner/Social Support: Patient reports that her partner does provide good support.  She also relies on her \"grandmother \"whom is a family friend who helps to watch her children.  She is currently living with her in-laws which she reports is not a good situation for her and her partner.  There are a lot of people living in one home and she reports that her mother-in-law does not really like her and is often critical of her.  She also reports that she is tried to confide in her best friend, whom does not have children, who has not been very helpful or kind to her in regards to her mental health.  She reports that she often does not believe her.    History of Trauma: Patient denies any history of trauma.    Current living situation/household members: Currently living with her partner, 4 children, mother-in-law and father-in-law.  It is her in-laws home.    Relevant family history/structure/dynamics: Patient's  was most recently in the " , he discharged from the  in December.  Her partner had been in the  throughout her entire other pregnancies and was actually deployed for over a year during her third pregnancy and delivery.  He is now currently working for security.  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    Denies history of thyroid disorder, anemia, vitamin D deficiency, she reports that her thyroid has been checked several times and is always been normal.                     Current medicines (including changes today)  Current Outpatient Prescriptions   Medication Sig Dispense Refill   • naproxen (NAPROSYN) 500 MG Tab Take  by mouth every 12 hours as needed. Pain  0   • methocarbamol (ROBAXIN) 750 MG Tab TAKE 2 TABLETS BY MOUTH EVERY 6 HOURS AS NEEDED FOR MUSCLE SPASM  0   • FLUoxetine (PROZAC) 20 MG Cap Take 1 Cap by mouth every day. 30 Cap 1     No current facility-administered medications for this visit.        She  has a past medical history of Anxiety; ASTHMA; Depression; Head ache; Headache(784.0); and Miscarriage. She also has no past medical history of Ulcer.    She  has a past surgical history that includes tubal coagulation laparoscopic bilateral and primary c section.     Social History     Social History   • Marital status:      Spouse name: N/A   • Number of children: N/A   • Years of education: N/A     Social History Main Topics   • Smoking status: Former Smoker     Quit date: 9/1/2018   • Smokeless tobacco: Never Used      Comment: 8 pack year hx   • Alcohol use No      Comment: quit 10/2011; once or twice a week prior to   • Drug use: No      Comment: quit 10/2011; marijuana daily prior to   • Sexual activity: Yes     Partners: Male     Birth control/ protection: Surgical      Comment: none      Other Topics Concern   • Not on file     Social History Narrative    ** Merged History Encounter **                Family History   Problem Relation Age  "of Onset   • Asthma Father    • Diabetes Paternal Grandmother           PSYCHIATRIC TREATMENT HISTORY:  History of outpatient psych treatment: Patient is not currently being followed by any psychiatric care, initiated most recently on Prozac for a primary care provider.                History of psych hospitalizations: 2011, also age 15.     History of prior treatment/psychotherpay/medication management: Previous treatment with Zoloft, increased anxiety.  Seroquel?  She has seen a therapist in the past although she has not seen one now.    DEVELOPMENTAL HISTORY:  Patient was born and raised in [facility].  She was raised by her mother and stepfather.  Her parents  when she was 6 years old.  Her father was in the  in and out of her life.  She reports that growing up was \"not great.  \"       EDUCATIONAL:  Highest grade level completed: Patient did not complete high school, completed 11th grade.  She reports that she hated school, and also did not like school work.  She does have upcoming plans to complete her GED perhaps.    SUBSTANCE USE/ADDICTION HISTORY:  Is there a family history of substance use/addiction? No     Does patient acknowledge use of/dependence on substances? no    Any other addictive behavior reported (gambling, shopping, sex)? No       Amphetamine: Denies   Cannabis: Patient has tried marijuana on and off for several years, reports in the past that has actually helped her sleep, she last use in early 2018.  None during pregnancy.  Does not believe she wants to smoke marijuana now.  Cocaine: Denies   Ecstasy: Patient reports she used ecstasy when she was 18 years old on and off for approximately 1 year.  Has not used since that time.  Hallucinogens: Denies   Inhalants: Denies   Opiates: Denies   Sedatives: Denies   Other: Patient reports that she was heavily drinking in 2011, this was the last time she was hospitalized for mental health issue.  She only drinks occasionally now.  Does " not believe she has a drinking problem.    ABUSE/NEGLECT:  Does patient report feeling “unsafe” in his/her home, or afraid of anyone? No     History of physical, sexual, or emotional abuse? No     Is there evidence of neglect by self? No     Is there evidence of neglect of children/baby? No     Does the patient report any history of CPS/APS/police involvement related to suspected abuse/neglect or domestic violence? No      LEGAL HISTORY:  Has the patient ever been involved with juvenile, adult, or family legal systems?  Patient reports she was arrested for underage drinking once, otherwise no other legal history.  Does patient report ever committing a crime?   Underage drinking  Does patient report every being arrested? Yes   Does patient report ever being a victim of a crime? No   Does patient report involvement in any current legal issues? No        SAFETY ASSESSMENT - SELF:  Does patient acknowledge current or past symptoms of dangerousness to self?  Patient reports ongoing intermittent suicidal ideation for several years.  She has no current plan.  She has been hospitalized at age 15 and then again in 2011 for possible suicide attempt or suicidal ideation.  Patient reports that she took pills at the time but it is unclear if she is actually ever had an actual suicide attempt.     History of suicide by family member: yes  History of suicide by friend/significant other: no    If the patient has endorsed SI:    Recent change in amount/specificity/intensity of suicidal thoughts or self-harm behavior?  Patient reports there is no real increased frequency or intensity of suicidal ideation, she reports it appears to be fairly stable and worse when she is under stress.  Recent change in amount/specificity/intensity of thoughts or threats to harm others/baby?  Denies any thoughts of harming her baby    Current access to firearms, medications, or other identified means of suicide/self-harm?  No      Protective factors  "present: Patient reports that she does not want to hurt herself, she wants to care for her children.    ROS  Mood: Describes current mood as \"blah\".  Anxiety: Patient admits to frequent worry.  She reports she worries about anything, she also becomes anxious when she has to drive.  She reports feeling sad and depressed, particularly about her living situation.  D  Sleep:  Denies trouble with sleeping, patient reports frequent waking only because baby is waking 2-3 times per night.  When baby is sleeping she can sleep.  Psychomotor/Energy:  Denies Psychomotor retardation or Chronic impulsivity  Eating/Appetite: Patient admits to decreased appetite at times.  She is drinking a lot of water.  Reports she normally has a good relationship with food.   Cognitive: Patient admits to distractibility at times.    Psychosis: Patient admits to suicidal ideation, which \"comes and goes.  \"Denies any plan.  Denies hallucinations, delusions, paranoia. Denies intrusive thoughts.  Denies thought of hurting her baby.   Social: Patient reports possible dysfunctional relationship with peers and/or best friend.  She admits to feeling socially withdrawn. Reports good relationship with partner.    Positive for intermittent migraines, none today.  No fever, no chest pain, no palpitations, no shortness of breath, no abdominal pain     All other systems reviewed and are negative.        Objective:     /76   Ht 1.651 m (5' 5\")   Wt 49.9 kg (110 lb)  Body mass index is 18.3 kg/m².    Physical Exam:   Constitutional: Alert, no distress, good eye contact   Respiratory: Unlabored respiratory effort, speaking in full sentences.   Skin: Warm, dry, good turgor, no rashes in visible areas.  Psych: Alert and oriented x3   Participation: Active verbal participation and attentive to visit    Grooming:   Clean and casual   Mood:  Patient describes their mood as \"blah\"   Affect: Appears congruent with mood, at times patient does appear indifferent, " tearful at times.  Minimally anxious.  Minimally flat at times.  She does appear hopeful near the end of her appointment.   Cognition: Cognitive function appears intact with no perceived deficits of short   term or long term memory.    Thought process: Wnl   Thought content: Wnl   Speech: Rate within normal limits and Volume within normal limits   Perception: Within normal limits   Insight:  Within normal limits   Judgment: Within normal limits   Family/couple interaction observations: Patient is attentive to baby who is sleeping throughout visit.      Assessment and Plan:   The following treatment plan was discussed    [place]  Depression Scale  Score 7/10/2019: 20      1. Severe episode of recurrent major depressive disorder, without psychotic features (HCC)  Lengthy discussion with patient in regards to her previous diagnosis, current diagnosis.  Lengthy discussion with patient in regards to treatment options to include medication versus therapy versus no treatment and also discussed the risks and benefits of each treatment option.    I have discussed with patient possibly seeing psychiatry in the future although she is limited due to her insurance.  She has initiated Prozac and reports that she had does not have increased anxiety at this time.  I have discussed at length with patient signs or symptoms to seek more emergent care or signs or symptoms to discontinue medication and the importance of if she decides to discontinue medication to consult with medical provider.     Discussed with patient the importance of having help at this time, napping when baby naps.  We discussed taking walks and getting outside.  She is hoping that she and her partner are able to get their own place in the future which I think would be incredibly beneficial for her.  We also discussed the importance of healthy boundaries with both her friends, and her mother-in-law.  Discussed with patient the importance of focusing on  what she can control and also healthy coping techniques when she becomes anxious.  Also discussed with patient this is something she will be able to discuss further with the therapist.  I have placed a referral for her today.    Encourage patient to keep her follow-up appointment with her primary care provider.  Also provided patient with information on local and online support groups to support women who has had partners in the  and also those who have experienced a .  She does appear to be saddened by her birth experience as well and as such we have discussed ways to perhaps cope with this in the future.  She is at least healing physically.    At this time we will follow-up in 1 week or sooner as needed.  We did discuss a safety plan at length, suicide hotline provided to patient as well.  She is amenable to plan and verbalized understanding.  - REFERRAL TO BEHAVIORAL HEALTH    2. History of suicide attempt  - REFERRAL TO BEHAVIORAL HEALTH    3. KYLER (generalized anxiety disorder)  See above   - REFERRAL TO BEHAVIORAL HEALTH    4. Suicidal ideation  Discussed with patient at length the safety plan.  - REFERRAL TO BEHAVIORAL HEALTH    5. Borderline personality disorder (HCC)  Discussed with patient the importance of having good consistent therapy in regards to this diagnosis.  She verbalizes understanding.  - REFERRAL TO BEHAVIORAL HEALTH       Current Suicide/Homicide Risk: Moderate   Safety Plan completed/reviewed, information provided for appropriate contacts     Discussed with patient s/s to seek emergent care or to report to ER.     Reviewed indication, dosage, usage and potential adverse effects of prescribed medications. Patient appears to understand, verbalizes understanding and is willing to try medications as prescribed.      Reviewed risks and benefits of treatment plan. Patient verbally agrees to plan of care.    Total 45 minutes face-to-face time spent with patient, with greater than 50%  of the total time discussing patient's issues and symptoms as listed above in assessment and plan, as well as managing coordination of care for future evaluation and treatment.       Followup: Return in about 2 weeks (around 7/24/2019) for Long (30 min), Postpartum Depression f/u.    JACKIE Kumar.     PLEASE NOTE: This dictation was created using voice recognition software. I have made every reasonable attempt to correct obvious errors, but I expect that there may be errors of grammar and possibly content that I did not discover prior finalizing this note.

## 2019-07-10 NOTE — NON-PROVIDER
New patient referred by Margaux Anderson   For: Moderate episode of recurrent major depressive disorder  Phone Number: [phone]  Pharmacy Confirmed   States she has SI thoughts once in a while, but wont act on it.

## 2019-07-10 NOTE — ASSESSMENT & PLAN NOTE
"Patient reports suicidal ideation \"comes and goes.  \".  She reports that when she is under a lot of stress she begins to think about suicide although she reports that she would never hurt herself as she wants to be her for all her children.  "

## 2019-07-10 NOTE — ASSESSMENT & PLAN NOTE
7/10/2019  Referred by: PCP, ABRAM Maldonado      Pregnancy/Birth History: Patient recently gave birth to her fourth child.  She has 4 children ages 4, 3, 2, 5 weeks.  HER-2 oldest were delivered vaginally, girls.  Her 2 youngest were delivered via , boys.  Patient reports that she was planning a , reports that she went into labor at 36 weeks with her most recent baby, then found out baby was breech and had an emergency .  She does admit that this was a very traumatic experience for her.  She reports that she has been physically healing fairly well although she has had increased headaches since her baby was born.  She is utilizing Excedrin Migraine as needed and has also seen her primary care provider.  She also plans to obtain better glasses which she believes is contributing to her headaches.  Patient reports that she likely had a history of postpartum depression after her first 2 children but most severe after her third child.  At the time she was initiated on Zoloft although she reports this made her much more anxious even after the first dose.  She did not follow-up in regards to treatment since that time.  Patient did have a tubal ligation, not planning any future children.    History of depression/anxiety: Patient reports an extensive history of depression and anxiety.  Patient was last hospitalized in , [place], patient reports she was initiated on medications at the time although she is not sure the name.  She does believe at least one with Seroquel which only made her sleep.  Patient reports a history of suicide attempt also at age 15 where she was hospitalized at the time.  Patient reports she has been diagnosed with depression and borderline personality disorder.  She believes Zoloft made her anxious in the past.  She is not currently seeing a therapist, her PCP recently just initiated her on Prozac of which she is only taken 1 dose, patient reports it does not appear to  "be making her more anxious.    Feeding: Patient is currently breast-feeding exclusively, reports this is going well, baby is gaining and thriving.  She breast-fed her previous children for approximately 3 to 7 months.    Partner/Social Support: Patient reports that her partner does provide good support.  She also relies on her \"grandmother \"whom is a family friend who helps to watch her children.  She is currently living with her in-laws which she reports is not a good situation for her and her partner.  There are a lot of people living in one home and she reports that her mother-in-law does not really like her and is often critical of her.  She also reports that she is tried to confide in her best friend, whom does not have children, who has not been very helpful or kind to her in regards to her mental health.  She reports that she often does not believe her.    History of Trauma: Patient denies any history of trauma.    Current living situation/household members: Currently living with her partner, 4 children, mother-in-law and father-in-law.  It is her in-laws home.    Relevant family history/structure/dynamics: Patient's  was most recently in the , he discharged from the  in December.  Her partner had been in the  throughout her entire other pregnancies and was actually deployed for over a year during her third pregnancy and delivery.  He is now currently working for security.  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    Denies history of thyroid disorder, anemia, vitamin D deficiency, she reports that her thyroid has been checked several times and is always been normal.                "

## 2019-07-11 ENCOUNTER — OFFICE VISIT (OUTPATIENT)
Dept: MEDICAL GROUP | Facility: MEDICAL CENTER | Age: 29
End: 2019-07-11
Attending: FAMILY MEDICINE
Payer: MEDICAID

## 2019-07-11 VITALS
OXYGEN SATURATION: 96 % | TEMPERATURE: 98.4 F | RESPIRATION RATE: 16 BRPM | HEART RATE: 104 BPM | HEIGHT: 65 IN | WEIGHT: 108 LBS | DIASTOLIC BLOOD PRESSURE: 64 MMHG | SYSTOLIC BLOOD PRESSURE: 102 MMHG | BODY MASS INDEX: 17.99 KG/M2

## 2019-07-11 DIAGNOSIS — F33.2 SEVERE EPISODE OF RECURRENT MAJOR DEPRESSIVE DISORDER, WITHOUT PSYCHOTIC FEATURES (HCC): ICD-10-CM

## 2019-07-11 PROCEDURE — 99213 OFFICE O/P EST LOW 20 MIN: CPT | Performed by: FAMILY MEDICINE

## 2019-07-11 RX ORDER — QUETIAPINE FUMARATE 25 MG/1
25 TABLET, FILM COATED ORAL 2 TIMES DAILY
Qty: 60 TAB | Refills: 3 | Status: SHIPPED | OUTPATIENT
Start: 2019-07-11 | End: 2019-07-25

## 2019-07-11 NOTE — PROGRESS NOTES
"Subjective:      Mago Beauchamp is a 29 y.o. female who presents with Medication Problem (pt started prozac yesterday and would like to discuss this medication)            HPI 1.  Irritability-patient presents initially for discussion of depression.  In exploring events patient actually notes that she feels intensely angry.  She is 5 weeks postpartum at this time.  She was started on Prozac 20 mg and took her first and only dose yesterday morning about 830.  She has noted very strong feelings of tremulousness without any external tremor, focal weakness or near syncope.  Patient is breast-feeding.  Reports that sleep is reasonable other than getting up to breast-feed.  She notes suicidal thoughts but strongly affirms that she will not act on these because of her commitment to her children.  In the past she has taken Zoloft which made her feel that all her senses were turned to extremely high uncomfortable levels and she discontinued that after a month.  She also took bupropion about 4 years ago and reported that it was ineffective after 1 month of treatment.  She has taken Seroquel in the more distant past and other than drowsiness did not note any ill effects.  She has been diagnosed with depression in the past and borderline personality disorder.    ROS negative for nausea, difficulty swallowing, tearfulness       Objective:     /64 (BP Location: Left arm, Patient Position: Sitting, BP Cuff Size: Adult)   Pulse (!) 104   Temp 36.9 °C (98.4 °F) (Temporal)   Resp 16   Ht 1.651 m (5' 5\")   Wt 49 kg (108 lb)   LMP 09/19/2018   SpO2 96%   Breastfeeding? Unknown   BMI 17.97 kg/m²      Physical Exam  Gen.- alert, cooperative, in no acute distress  Neck- midline trachea, thyroid not enlarged or tender,supple, no cervical adenopathy  Chest-clear to auscultation and percussion with normal breath sounds. No retractions. Chest wall nontender  Cardiac- regular rhythm and rate. No murmur, thrill, or " heave  Psych-normal affect with good eye contact. Normal grooming. Oriented x4.            Assessment/Plan:     1. Severe episode of recurrent major depressive disorder, without psychotic features (HCC)-patient seems to have some elements suggestive of bipolar mood disorder rather than unipolar mood disorder.  She clearly did not tolerate 2 SSRIs and failed bupropion in the past (dopamine).  Psychology referral is in process.  Hopefully that will clarify her diagnostic categories as well.    Plan: 1.  Trial of Seroquel initially just 25 mg at bedtime patient may expand this to twice daily in the next 2 weeks if she desires  2.  Follow-up in 2 weeks-her normal PCP does not have schedule availability at that time so I will see her one more time in follow-up and we will schedule her with her normal PCP 2 visits out.  3.  Pursue psychology referral as well

## 2019-07-25 ENCOUNTER — OFFICE VISIT (OUTPATIENT)
Dept: MEDICAL GROUP | Facility: MEDICAL CENTER | Age: 29
End: 2019-07-25
Attending: FAMILY MEDICINE
Payer: MEDICAID

## 2019-07-25 VITALS
OXYGEN SATURATION: 95 % | SYSTOLIC BLOOD PRESSURE: 104 MMHG | HEIGHT: 65 IN | DIASTOLIC BLOOD PRESSURE: 60 MMHG | TEMPERATURE: 98.8 F | BODY MASS INDEX: 18.49 KG/M2 | WEIGHT: 111 LBS | HEART RATE: 80 BPM | RESPIRATION RATE: 16 BRPM

## 2019-07-25 DIAGNOSIS — F33.2 SEVERE EPISODE OF RECURRENT MAJOR DEPRESSIVE DISORDER, WITHOUT PSYCHOTIC FEATURES (HCC): ICD-10-CM

## 2019-07-25 DIAGNOSIS — F60.3 BORDERLINE PERSONALITY DISORDER (HCC): ICD-10-CM

## 2019-07-25 PROCEDURE — 99212 OFFICE O/P EST SF 10 MIN: CPT | Performed by: FAMILY MEDICINE

## 2019-07-25 PROCEDURE — 99213 OFFICE O/P EST LOW 20 MIN: CPT | Performed by: FAMILY MEDICINE

## 2019-07-25 RX ORDER — OXCARBAZEPINE 150 MG/1
TABLET, FILM COATED ORAL
Qty: 30 TAB | Refills: 2 | Status: SHIPPED | OUTPATIENT
Start: 2019-07-25 | End: 2019-10-01

## 2019-07-25 NOTE — PROGRESS NOTES
"Subjective:      Mago Beauchamp is a 29 y.o. female who presents with No chief complaint on file.            HPI 1.  Depression-patient reports that she had to wait until 7/20/2019 to try taking her first test dose of Seroquel.  She took half of a tablet and reports that she was extremely groggy and drugged for 48 hours.  She did not take any further medication.  In the past she took bupropion without effect.  In the past she felt shaky and tremulous with fluoxetine and also had a negative reaction to sertraline.  Review of her more distant chart shows that she has taken Trileptal which she thinks she tolerated without difficulty in the past.  She also had taken risperidone and citalopram which were noted in the chart but there is no comment as to how effective or tolerable they were back in 2012.  Patient also noted a transient marked dip in her breast milk production after that single dose of Seroquel last week and    ROS negative for current unusual somnolence, suicidal ideation, nausea       Objective:     /60 (BP Location: Left arm, Patient Position: Sitting, BP Cuff Size: Adult)   Pulse 80   Temp 37.1 °C (98.8 °F) (Temporal)   Resp 16   Ht 1.651 m (5' 5\")   Wt 50.3 kg (111 lb)   LMP 09/19/2018   SpO2 95%   BMI 18.47 kg/m²      Physical Exam  Gen.- alert, cooperative, in no acute distress  Neck- midline trachea, thyroid not enlarged or tender,supple, no cervical adenopathy  Chest-clear to auscultation and percussion with normal breath sounds. No retractions. Chest wall nontender  Cardiac- regular rhythm and rate. No murmur, thrill, or heave  Psych-normal affect with good eye contact. Normal grooming. Oriented x4.            Assessment/Plan:     1. Severe episode of recurrent major depressive disorder, without psychotic features (HCC)      2. Borderline personality disorder (HCC)    Plan: 1.  Cautious trial of one half of a tablet of 150 mg Trileptal at bedtime.  If tolerated after 7 days may " increase to a single 150 mg tablet at bedtime  2.  Follow-up with patient's usual PCP-scheduled in 2 weeks  3.  Patient will monitor both herself and her infant for any adverse side effects including irritability, decreased milk production, severe somnolence

## 2019-08-21 RX ORDER — FLUOXETINE HYDROCHLORIDE 20 MG/1
CAPSULE ORAL
Qty: 30 CAP | Refills: 1 | Status: SHIPPED | OUTPATIENT
Start: 2019-08-21 | End: 2019-10-01

## 2019-10-01 ENCOUNTER — OFFICE VISIT (OUTPATIENT)
Dept: MEDICAL GROUP | Facility: MEDICAL CENTER | Age: 29
End: 2019-10-01
Attending: NURSE PRACTITIONER
Payer: MEDICAID

## 2019-10-01 VITALS
HEART RATE: 71 BPM | DIASTOLIC BLOOD PRESSURE: 70 MMHG | TEMPERATURE: 98.1 F | BODY MASS INDEX: 17.42 KG/M2 | WEIGHT: 108.4 LBS | SYSTOLIC BLOOD PRESSURE: 98 MMHG | OXYGEN SATURATION: 98 % | HEIGHT: 66 IN

## 2019-10-01 DIAGNOSIS — G56.01 CARPAL TUNNEL SYNDROME OF RIGHT WRIST: ICD-10-CM

## 2019-10-01 DIAGNOSIS — Z23 NEED FOR VACCINATION: ICD-10-CM

## 2019-10-01 PROCEDURE — 99214 OFFICE O/P EST MOD 30 MIN: CPT | Mod: 25 | Performed by: NURSE PRACTITIONER

## 2019-10-01 PROCEDURE — 90686 IIV4 VACC NO PRSV 0.5 ML IM: CPT

## 2019-10-01 PROCEDURE — 99212 OFFICE O/P EST SF 10 MIN: CPT | Performed by: NURSE PRACTITIONER

## 2019-10-01 RX ORDER — IBUPROFEN 800 MG/1
800 TABLET ORAL EVERY 8 HOURS PRN
Qty: 30 TAB | Refills: 3 | Status: SHIPPED | OUTPATIENT
Start: 2019-10-01

## 2019-10-01 RX ORDER — PREDNISONE 10 MG/1
TABLET ORAL
Qty: 30 TAB | Refills: 0 | Status: SHIPPED | OUTPATIENT
Start: 2019-10-01

## 2019-10-01 RX ORDER — IBUPROFEN 200 MG
200 TABLET ORAL EVERY 6 HOURS PRN
COMMUNITY
End: 2019-10-01

## 2019-10-01 RX ORDER — PREDNISONE 10 MG/1
TABLET ORAL
Qty: 30 TAB | Refills: 0 | Status: SHIPPED | OUTPATIENT
Start: 2019-10-01 | End: 2019-10-01 | Stop reason: SDUPTHER

## 2019-10-01 ASSESSMENT — ENCOUNTER SYMPTOMS
PALPITATIONS: 0
BLOOD IN STOOL: 0
WHEEZING: 0
DIARRHEA: 0
CONSTIPATION: 0
WEIGHT LOSS: 0
ABDOMINAL PAIN: 0
CHILLS: 0
COUGH: 0
SHORTNESS OF BREATH: 0
FEVER: 0

## 2019-10-01 NOTE — ASSESSMENT & PLAN NOTE
Started approx 6 weeks ago in her right hand.  She is having wrist pain that travels to her pointer and middle fingers.  Her right hand is weak.  She has a brace that she wears at night, does not rally help.

## 2019-10-01 NOTE — PROGRESS NOTES
Chief Complaint   Patient presents with   • Wrist Pain     (R), concerns of carpal tunnel, aches, tingle, radiates to elbow       Subjective:     HPI:   Mago Beauchamp is a 29 y.o. female here to discuss the evaluation and management of:        Carpal tunnel syndrome of right wrist  Started approx 6 weeks ago in her right hand.  She is having wrist pain that travels to her pointer and middle fingers.  Her right hand is weak.  She has a brace that she wears at night, does not rally help.        ROS  Review of Systems   Constitutional: Negative for chills, fever, malaise/fatigue and weight loss.   Respiratory: Negative for cough, shortness of breath and wheezing.    Cardiovascular: Negative for chest pain, palpitations and leg swelling.   Gastrointestinal: Negative for abdominal pain, blood in stool, constipation and diarrhea.   Musculoskeletal: Positive for joint pain.         Allergies   Allergen Reactions   • Reglan [Metoclopramide]    • Zoloft [Sertraline] Anxiety       Current medicines (including changes today)  Current Outpatient Medications   Medication Sig Dispense Refill   • ibuprofen (MOTRIN) 800 MG Tab Take 1 Tab by mouth every 8 hours as needed for Moderate Pain or Inflammation. 30 Tab 3   • predniSONE (DELTASONE) 10 MG Tab Take 20 mg daily for 10 days, 10 mg for 5 days and 5 mg for 5 days 30 Tab 0   • methocarbamol (ROBAXIN) 750 MG Tab TAKE 2 TABLETS BY MOUTH EVERY 6 HOURS AS NEEDED FOR MUSCLE SPASM  0     No current facility-administered medications for this visit.        Social History     Tobacco Use   • Smoking status: Former Smoker     Last attempt to quit: 2018     Years since quittin.0   • Smokeless tobacco: Never Used   • Tobacco comment: 8 pack year hx   Substance Use Topics   • Alcohol use: No     Comment: quit 10/2011; once or twice a week prior to   • Drug use: No     Comment: quit 10/2011; marijuana daily prior to       Patient Active Problem List    Diagnosis Date Noted   •  "Severe episode of recurrent major depressive disorder, without psychotic features ([facility]) 08/01/2011     Priority: High   • History of suicide attempt 08/01/2011     Priority: High   • Acetaminophen overdose 08/01/2011     Priority: High   • Anticholinergic drug overdose 08/01/2011     Priority: High   • Carpal tunnel syndrome of right wrist 10/01/2019   • Suicidal ideation 07/10/2019   • KYLER (generalized anxiety disorder) 07/10/2019   • Borderline personality disorder (HCC) 07/10/2019   • Chronic tension headache 07/09/2019       Family History   Problem Relation Age of Onset   • Asthma Father    • Diabetes Paternal Grandmother           Objective:     BP (!) 98/70 (BP Location: Left arm, Patient Position: Sitting, BP Cuff Size: Adult)   Pulse 71   Temp 36.7 °C (98.1 °F) (Temporal)   Ht 1.664 m (5' 5.5\")   Wt 49.2 kg (108 lb 6.4 oz)   SpO2 98%  Body mass index is 17.76 kg/m².    Physical Exam:  Physical Exam   Constitutional: She is oriented to person, place, and time and well-developed, well-nourished, and in no distress. No distress.   HENT:   Head: Normocephalic.   Right Ear: Tympanic membrane and external ear normal.   Left Ear: Tympanic membrane and external ear normal.   Eyes: Pupils are equal, round, and reactive to light. Conjunctivae and EOM are normal.   Neck: Normal range of motion. Neck supple. No tracheal deviation present.   Cardiovascular: Normal rate, regular rhythm, normal heart sounds and intact distal pulses.   Pulmonary/Chest: Effort normal and breath sounds normal.   Abdominal: Soft. Bowel sounds are normal.   Musculoskeletal: Normal range of motion.        Right wrist: She exhibits tenderness. She exhibits no bony tenderness, no swelling, no effusion, no crepitus and no deformity.   Positive Phalen and Tinel tests on right wrist.   Lymphadenopathy:        Head (right side): No preauricular adenopathy present.        Head (left side): No preauricular adenopathy present.     She has no " cervical adenopathy.   Neurological: She is alert and oriented to person, place, and time. She has normal sensation, normal strength and intact cranial nerves. Gait normal.   Positive Phalen and Tinel test on right wrist.   Skin: Skin is warm and dry.   Psychiatric: Affect and judgment normal.     I have placed the below orders and discussed them with an approved delegating provider.  The MA is performing the below orders under the direction of Dr. Moseley.      Assessment and Plan:     The following treatment plan was discussed:    1. Carpal tunnel syndrome of right wrist  ibuprofen (MOTRIN) 800 MG Tab    predniSONE (DELTASONE) 10 MG Tab  -I have counseled the patient to ensure that she takes both of these medications with food and discontinued use immediately should she develop abdominal pain.  We will try to manage this with oral steroids, ibuprofen, and continued use of her wrist brace.  Should this not improve the patient will reach out and let me know that she would like to see orthopedics for glucocorticoid injections in her wrist.   2. Need for vaccination  Influenza Vaccine Quad Injection (PF)       Any change or worsening of signs or symptoms, patient encouraged to follow-up or report to emergency room for further evaluation. Patient verbalizes understanding and agrees.    Follow-Up: Return in about 4 weeks (around 10/29/2019), or if symptoms worsen or fail to improve.      PLEASE NOTE: This dictation was created using voice recognition software. I have made every reasonable attempt to correct obvious errors, but I expect that there are errors of grammar and possibly content that I did not discover before finalizing the note.

## 2019-10-07 RX ORDER — ONDANSETRON 4 MG/1
4 TABLET, FILM COATED ORAL EVERY 6 HOURS PRN
Qty: 20 TAB | Refills: 0 | Status: SHIPPED | OUTPATIENT
Start: 2019-10-07

## 2019-12-11 DIAGNOSIS — R10.33 PERIUMBILICAL ABDOMINAL PAIN: ICD-10-CM

## 2019-12-12 ENCOUNTER — OFFICE VISIT (OUTPATIENT)
Dept: MEDICAL GROUP | Facility: MEDICAL CENTER | Age: 29
End: 2019-12-12
Attending: NURSE PRACTITIONER
Payer: MEDICAID

## 2019-12-12 VITALS
HEIGHT: 65 IN | WEIGHT: 108.7 LBS | OXYGEN SATURATION: 96 % | BODY MASS INDEX: 18.11 KG/M2 | HEART RATE: 87 BPM | SYSTOLIC BLOOD PRESSURE: 100 MMHG | DIASTOLIC BLOOD PRESSURE: 60 MMHG | RESPIRATION RATE: 16 BRPM | TEMPERATURE: 99.3 F

## 2019-12-12 DIAGNOSIS — R10.2 PELVIC PAIN: ICD-10-CM

## 2019-12-12 DIAGNOSIS — R11.0 NAUSEA: ICD-10-CM

## 2019-12-12 PROCEDURE — 99213 OFFICE O/P EST LOW 20 MIN: CPT | Performed by: NURSE PRACTITIONER

## 2019-12-12 PROCEDURE — 99214 OFFICE O/P EST MOD 30 MIN: CPT | Performed by: NURSE PRACTITIONER

## 2019-12-12 RX ORDER — AZITHROMYCIN 250 MG/1
TABLET, FILM COATED ORAL
COMMUNITY
Start: 2019-10-13

## 2019-12-12 RX ORDER — VARENICLINE TARTRATE 1 MG/1
TABLET, FILM COATED ORAL
COMMUNITY

## 2019-12-12 RX ORDER — IBUPROFEN 800 MG/1
TABLET ORAL
COMMUNITY

## 2019-12-12 RX ORDER — VARENICLINE TARTRATE 0.5 MG/1
TABLET, FILM COATED ORAL
COMMUNITY

## 2019-12-12 RX ORDER — ALBUTEROL SULFATE 90 UG/1
AEROSOL, METERED RESPIRATORY (INHALATION)
COMMUNITY
Start: 2019-10-01

## 2019-12-12 RX ORDER — MECLIZINE HYDROCHLORIDE 25 MG/1
12.5-25 TABLET ORAL 3 TIMES DAILY PRN
Qty: 60 TAB | Refills: 1 | Status: SHIPPED | OUTPATIENT
Start: 2019-12-12

## 2019-12-12 ASSESSMENT — ENCOUNTER SYMPTOMS
CHILLS: 0
FEVER: 0
BLOOD IN STOOL: 0
DIARRHEA: 0
NAUSEA: 1
PALPITATIONS: 0
COUGH: 0
WEIGHT LOSS: 0
CONSTIPATION: 0
WHEEZING: 0
VOMITING: 0
ABDOMINAL PAIN: 0
SHORTNESS OF BREATH: 0

## 2019-12-12 NOTE — PROGRESS NOTES
Chief Complaint   Patient presents with   • Hernia   • Nausea       Subjective:     HPI:   Mago Beauchamp is a 29 y.o. female here to discuss the evaluation and management of:        Nausea  Nausea with no vomiting started 2 weeks ago.  Yesterday at 0400 her nausea increased with spinning.  She reports she felt so sick she could not even close her eyes.  She went to , they found a suspected hernia.  They prescribed zofran ,but she could not get it filled.  She went to ER, they suspect it is GI related.  They gave her GI cocktail and phenergan. They also gave her a script for prilosec and maalox.  Denies fevers.  Her children have been sick with respiratory complaints.  She feels lightheaded with nausea.  The nausea is worse in the morning and at night.  She feels like the room is spinning.      Pelvic pain  Has chronic pelvic pain.  She had this when she stopped breathing her second to youngest child. She started breastfeeding her youngest and the pain resolved.  She has now stopped breastfeeding and the pain is returned.  Her pain is relieved while menstruating.  She feels pain in her right ovary region while laying on her left side. Reports pain with intercourse, especially with deep penetration.  Her menses are regular.       ROS  Review of Systems   Constitutional: Negative for chills, fever, malaise/fatigue and weight loss.   Respiratory: Negative for cough, shortness of breath and wheezing.    Cardiovascular: Negative for chest pain, palpitations and leg swelling.   Gastrointestinal: Positive for nausea. Negative for abdominal pain, blood in stool, constipation, diarrhea and vomiting.         Allergies   Allergen Reactions   • Reglan [Metoclopramide]    • Zoloft [Sertraline] Anxiety       Current medicines (including changes today)  Current Outpatient Medications   Medication Sig Dispense Refill   • meclizine (ANTIVERT) 25 MG Tab Take 0.5-1 Tabs by mouth 3 times a day as needed for Vertigo. 60 Tab 1    • ibuprofen (MOTRIN) 800 MG Tab ibuprofen 800 mg tablet   Take 1 tablet 3 times a day by oral route as needed.     • varenicline (CHANTIX) 1 MG tablet Chantix Continuing Month Box 1 mg tablet   Take 1 tablet twice a day by oral route.     • azithromycin (ZITHROMAX) 250 MG Tab      • albuterol 108 (90 Base) MCG/ACT Aero Soln inhalation aerosol      • varenicline (CHANTIX) 0.5 MG tablet Chantix Starting Month Box 0.5 mg (11)-1 mg (42) tablets in dose pack   0.5 mg PO qd x3 days, then 0.5 mg PO bid s; Max: 2 mg/da     • ondansetron (ZOFRAN) 4 MG Tab tablet Take 1 Tab by mouth every 6 hours as needed for Nausea/Vomiting. 20 Tab 0   • ibuprofen (MOTRIN) 800 MG Tab Take 1 Tab by mouth every 8 hours as needed for Moderate Pain or Inflammation. 30 Tab 3   • predniSONE (DELTASONE) 10 MG Tab Take 20 mg daily for 10 days, 10 mg for 5 days and 5 mg for 5 days 30 Tab 0   • methocarbamol (ROBAXIN) 750 MG Tab TAKE 2 TABLETS BY MOUTH EVERY 6 HOURS AS NEEDED FOR MUSCLE SPASM  0     No current facility-administered medications for this visit.        Social History     Tobacco Use   • Smoking status: Former Smoker     Last attempt to quit: 2018     Years since quittin.2   • Smokeless tobacco: Never Used   • Tobacco comment: 8 pack year hx   Substance Use Topics   • Alcohol use: No     Comment: quit 10/2011; once or twice a week prior to   • Drug use: No     Comment: quit 10/2011; marijuana daily prior to       Patient Active Problem List    Diagnosis Date Noted   • Severe episode of recurrent major depressive disorder, without psychotic features ([facility]) 2011     Priority: High   • History of suicide attempt 2011     Priority: High   • Acetaminophen overdose 2011     Priority: High   • Anticholinergic drug overdose 2011     Priority: High   • Nausea 2019   • Pelvic pain 2019   • Carpal tunnel syndrome of right wrist 10/01/2019   • Suicidal ideation 07/10/2019   • KYLER (generalized anxiety  "disorder) 07/10/2019   • Borderline personality disorder (HCC) 07/10/2019   • Chronic tension headache 07/09/2019       Family History   Problem Relation Age of Onset   • Asthma Father    • Diabetes Paternal Grandmother           Objective:     /60 (BP Location: Left arm, Patient Position: Sitting, BP Cuff Size: Adult)   Pulse 87   Temp 37.4 °C (99.3 °F) (Temporal)   Resp 16   Ht 1.651 m (5' 5\")   Wt 49.3 kg (108 lb 11.2 oz)   SpO2 96%  Body mass index is 18.09 kg/m².    Physical Exam:  Physical Exam   Constitutional: She is oriented to person, place, and time and well-developed, well-nourished, and in no distress. No distress.   HENT:   Head: Normocephalic.   Right Ear: Tympanic membrane and external ear normal.   Left Ear: Tympanic membrane and external ear normal.   Eyes: Pupils are equal, round, and reactive to light. Conjunctivae and EOM are normal.   Neck: Normal range of motion. Neck supple. No tracheal deviation present.   Cardiovascular: Normal rate, regular rhythm, normal heart sounds and intact distal pulses.   Pulmonary/Chest: Effort normal and breath sounds normal.   Abdominal: Soft. Bowel sounds are normal. A hernia is present. Hernia confirmed positive in the umbilical area.   Musculoskeletal: Normal range of motion.   Lymphadenopathy:        Head (right side): No preauricular adenopathy present.        Head (left side): No preauricular adenopathy present.     She has no cervical adenopathy.   Neurological: She is alert and oriented to person, place, and time. She has normal sensation, normal strength and intact cranial nerves. Gait normal.   Skin: Skin is warm and dry.   Psychiatric: Affect and judgment normal.       Assessment and Plan:     The following treatment plan was discussed:    1. Nausea  meclizine (ANTIVERT) 25 MG Tab    REFERRAL TO PHYSICAL THERAPY Reason for Therapy: Eval/Treat/Report  -New problem.  Suspect this could be multifactorial.  Suspect vertigo and possibly GERD.  I " have advised the patient to use the Zofran over Phenergan as there is concern for somnolence while watching her children.  I have also advised her to start the omeprazole.  I have provided her with meclizine, advising her to start at 12.5 mg to ensure she does not get too fatigued.  I have asked her to take her first dose of this medication while her  is home.  She does have a suspected umbilical hernia present, I have already ordered the ultrasound and asked her to schedule.  ER precautions discussed at length.   2. Pelvic pain  US-PELVIC TRANSVAGINAL ONLY    REFERRAL TO GYNECOLOGY  -Chronic problem.  She delia reports in the past that she was told she may need gynecological surgery although she is not sure why or which surgery.  I have referred her to gynecology and placed an order for transvaginal ultrasound.       Any change or worsening of signs or symptoms, patient encouraged to follow-up or report to emergency room for further evaluation. Patient verbalizes understanding and agrees.    Follow-Up: Return if symptoms worsen or fail to improve.  I will contact patient with imaging results.      PLEASE NOTE: This dictation was created using voice recognition software. I have made every reasonable attempt to correct obvious errors, but I expect that there are errors of grammar and possibly content that I did not discover before finalizing the note.

## 2019-12-12 NOTE — ASSESSMENT & PLAN NOTE
Nausea with no vomiting started 2 weeks ago.  Yesterday at 0400 her nausea increased with spinning.  She reports she felt so sick she could not even close her eyes.  She went to UC, they found a suspected hernia.  They prescribed zofran ,but she could not get it filled.  She went to ER, they suspect it is GI related.  They gave her GI cocktail and phenergan. They also gave her a script for prilosec and maalox.  Denies fevers.  Her children have been sick with respiratory complaints.  She feels lightheaded with nausea.  The nausea is worse in the morning and at night.  She feels like the room is spinning.

## 2019-12-12 NOTE — ASSESSMENT & PLAN NOTE
Has chronic pelvic pain.  She had this when she stopped breathing her second to youngest child. She started breastfeeding her youngest and the pain resolved.  She has now stopped breastfeeding and the pain is returned.  Her pain is relieved while menstruating.  She feels pain in her right ovary region while laying on her left side. Reports pain with intercourse, especially with deep penetration.  Her menses are regular.

## 2019-12-23 ENCOUNTER — HOSPITAL ENCOUNTER (OUTPATIENT)
Dept: RADIOLOGY | Facility: MEDICAL CENTER | Age: 29
End: 2019-12-23
Attending: NURSE PRACTITIONER
Payer: MEDICAID

## 2019-12-23 DIAGNOSIS — R10.33 PERIUMBILICAL ABDOMINAL PAIN: ICD-10-CM

## 2019-12-23 DIAGNOSIS — R10.2 PELVIC PAIN: ICD-10-CM

## 2019-12-23 PROCEDURE — 76830 TRANSVAGINAL US NON-OB: CPT

## 2019-12-23 PROCEDURE — 76705 ECHO EXAM OF ABDOMEN: CPT

## 2020-01-01 NOTE — RESULT ENCOUNTER NOTE
"Solo Mercedes,      I got the results of your 2 ultrasounds.  Your abdominal ultrasound was negative for hernia.  Your pelvic ultrasound did show a large follicle (\"egg\") that could be the source of your pelvic pain.  This is a common finding and will likely resolve on its own.  If you notice a significant increase in your pelvic pain or if your abdomen becomes swollen and firm please go to the emergency room.  I do not expect this, but I would like to prepare my patients just in case.  Please let me know if you have any questions or concerns.  Margaux"

## 2020-02-10 ENCOUNTER — APPOINTMENT (OUTPATIENT)
Dept: PHYSICAL THERAPY | Facility: REHABILITATION | Age: 30
End: 2020-02-10
Payer: MEDICAID

## 2020-02-12 ENCOUNTER — APPOINTMENT (OUTPATIENT)
Dept: PHYSICAL THERAPY | Facility: REHABILITATION | Age: 30
End: 2020-02-12
Payer: MEDICAID